# Patient Record
Sex: FEMALE | Race: WHITE | Employment: OTHER | URBAN - METROPOLITAN AREA
[De-identification: names, ages, dates, MRNs, and addresses within clinical notes are randomized per-mention and may not be internally consistent; named-entity substitution may affect disease eponyms.]

---

## 2017-01-31 DIAGNOSIS — F41.9 ANXIETY: Primary | ICD-10-CM

## 2017-01-31 RX ORDER — LORAZEPAM 1 MG/1
1.5 TABLET ORAL DAILY PRN
Qty: 135 TABLET | Refills: 0 | Status: SHIPPED | OUTPATIENT
Start: 2017-01-31 | End: 2017-05-15

## 2017-01-31 NOTE — TELEPHONE ENCOUNTER
Ativan- not previously filled by you- please advise   Last Written Prescription Date: 9/25/16  Last Fill Quantity: 135,  # refills: 0   Last Office Visit with [facility], [facility] or [facility] prescribing provider: 11/15/16

## 2017-02-13 ENCOUNTER — TELEPHONE (OUTPATIENT)
Dept: FAMILY MEDICINE | Facility: OTHER | Age: 67
End: 2017-02-13

## 2017-02-13 DIAGNOSIS — G89.29 CHRONIC PAIN OF LEFT KNEE: Primary | ICD-10-CM

## 2017-02-13 DIAGNOSIS — M25.562 CHRONIC PAIN OF LEFT KNEE: Primary | ICD-10-CM

## 2017-02-13 NOTE — TELEPHONE ENCOUNTER
If she is talking about a cyst behind the knee, then she should see Orthopedics, not general surgery.  Please clarify what she is looking for.

## 2017-04-18 DIAGNOSIS — J30.2 SEASONAL ALLERGIC RHINITIS, UNSPECIFIED ALLERGIC RHINITIS TRIGGER: Primary | ICD-10-CM

## 2017-04-18 RX ORDER — FLUTICASONE PROPIONATE 50 MCG
1-2 SPRAY, SUSPENSION (ML) NASAL DAILY
Qty: 1 BOTTLE | Refills: 0 | Status: SHIPPED | OUTPATIENT
Start: 2017-04-18 | End: 2017-06-14

## 2017-04-18 NOTE — TELEPHONE ENCOUNTER
flonase      Last Written Prescription Date: historical medication  Last Fill Quantity: 1,  # refills: 0   Last Office Visit with [facility] or [facility] prescribing provider: 11/15/16

## 2017-05-15 ENCOUNTER — OFFICE VISIT (OUTPATIENT)
Dept: FAMILY MEDICINE | Facility: OTHER | Age: 67
End: 2017-05-15
Attending: FAMILY MEDICINE
Payer: MEDICARE

## 2017-05-15 VITALS
TEMPERATURE: 97.2 F | SYSTOLIC BLOOD PRESSURE: 102 MMHG | HEART RATE: 60 BPM | WEIGHT: 119 LBS | DIASTOLIC BLOOD PRESSURE: 60 MMHG | BODY MASS INDEX: 19.83 KG/M2 | RESPIRATION RATE: 14 BRPM | HEIGHT: 65 IN

## 2017-05-15 DIAGNOSIS — Z12.31 ENCOUNTER FOR SCREENING MAMMOGRAM FOR BREAST CANCER: ICD-10-CM

## 2017-05-15 DIAGNOSIS — F41.9 ANXIETY: ICD-10-CM

## 2017-05-15 DIAGNOSIS — I83.92 VARICOSE VEINS OF LEFT LOWER EXTREMITY: ICD-10-CM

## 2017-05-15 DIAGNOSIS — M85.80 OSTEOPENIA: Primary | ICD-10-CM

## 2017-05-15 DIAGNOSIS — Z11.59 NEED FOR HEPATITIS C SCREENING TEST: ICD-10-CM

## 2017-05-15 DIAGNOSIS — Z78.0 POST-MENOPAUSAL: ICD-10-CM

## 2017-05-15 DIAGNOSIS — R53.83 FATIGUE, UNSPECIFIED TYPE: ICD-10-CM

## 2017-05-15 LAB
ERYTHROCYTE [DISTWIDTH] IN BLOOD BY AUTOMATED COUNT: 13.4 % (ref 10–15)
HCT VFR BLD AUTO: 39 % (ref 35–47)
HGB BLD-MCNC: 13 G/DL (ref 11.7–15.7)
MCH RBC QN AUTO: 31.5 PG (ref 26.5–33)
MCHC RBC AUTO-ENTMCNC: 33.3 G/DL (ref 31.5–36.5)
MCV RBC AUTO: 94 FL (ref 78–100)
PLATELET # BLD AUTO: 257 10E9/L (ref 150–450)
RBC # BLD AUTO: 4.13 10E12/L (ref 3.8–5.2)
TSH SERPL DL<=0.05 MIU/L-ACNC: 1.8 MU/L (ref 0.4–4)
WBC # BLD AUTO: 6 10E9/L (ref 4–11)

## 2017-05-15 PROCEDURE — 84443 ASSAY THYROID STIM HORMONE: CPT | Mod: ZL | Performed by: FAMILY MEDICINE

## 2017-05-15 PROCEDURE — 86803 HEPATITIS C AB TEST: CPT | Mod: ZL | Performed by: FAMILY MEDICINE

## 2017-05-15 PROCEDURE — 85027 COMPLETE CBC AUTOMATED: CPT | Mod: ZL | Performed by: FAMILY MEDICINE

## 2017-05-15 PROCEDURE — 99212 OFFICE O/P EST SF 10 MIN: CPT

## 2017-05-15 PROCEDURE — 99214 OFFICE O/P EST MOD 30 MIN: CPT | Performed by: FAMILY MEDICINE

## 2017-05-15 PROCEDURE — 36415 COLL VENOUS BLD VENIPUNCTURE: CPT | Mod: ZL | Performed by: FAMILY MEDICINE

## 2017-05-15 PROCEDURE — 99000 SPECIMEN HANDLING OFFICE-LAB: CPT | Performed by: FAMILY MEDICINE

## 2017-05-15 RX ORDER — LORAZEPAM 1 MG/1
1.5 TABLET ORAL DAILY PRN
Qty: 135 TABLET | Refills: 1 | Status: SHIPPED | OUTPATIENT
Start: 2017-05-15 | End: 2017-11-10

## 2017-05-15 NOTE — PROGRESS NOTES
SUBJECTIVE:                                                    Dena Cedeno is a 66 year old female who presents to clinic today for the following health issues:    Anxiety Follow-Up    Status since last visit: No change    Other associated symptoms:None    Complicating factors:   Significant life event: Yes-  Death in family   Current substance abuse: None  Depression symptoms: No  NANCY-7 SCORE 11/15/2016   Total Score 0        GAD7     Patient is also due for follow-up of osteopenia.  She would like DEXA at this time.  She states she would like to now visit with a Vascular surgeon regarding her symptomatic varicose veins on the left (Ortho has ruled out other causes of leg pain).  She is due to mammogram and hepatitis C screening.  Otherwise, chronic conditions are stable.  She does note some mild fatigue, and would like a TSH checked.        Problem list and histories reviewed & adjusted, as indicated.  Additional history: as documented    Patient Active Problem List   Diagnosis     ACP (advance care planning)     Osteopenia     Anxiety     Insomnia     Past Surgical History:   Procedure Laterality Date     APPENDECTOMY  2012     COLONOSCOPY  10/18/13    8 year follow-up recommended     GYN SURGERY  1978     D AND C     GYN SURGERY  1980    TUBAL LIGATION       Social History   Substance Use Topics     Smoking status: Former Smoker     Smokeless tobacco: Not on file     Alcohol use Yes      Comment: social     Family History   Problem Relation Age of Onset     OSTEOPOROSIS Mother      Colon Cancer Mother 58     Thyroid Cancer Daughter      CEREBROVASCULAR DISEASE Father      Alzheimer Disease Father      Hypertension Father      Dementia Sister      FRONTAL LOBE     Colon Cancer       grandfather     Colon Cancer       aunt         Current Outpatient Prescriptions   Medication Sig Dispense Refill     LORazepam (ATIVAN) 1 MG tablet Take 1.5 tablets (1.5 mg) by mouth daily as needed 135 tablet 1      "fluticasone (FLONASE) 50 MCG/ACT spray Spray 1-2 sprays into both nostrils daily 1 Bottle 0     Multiple Vitamins-Minerals (PRESERVISION AREDS PO) Take 2 capsules by mouth daily       Cholecalciferol (VITAMIN D3 PO) Take 2,000 Units by mouth daily       Omega-3 Fatty Acids (FISH OIL) 1200 MG CAPS Take 1 capsule by mouth daily       Calcium Carbonate (CALCIUM 600 PO) Take 1 tablet by mouth daily       magnesium 250 MG tablet Take 1 tablet by mouth 2 times daily        cetirizine (ZYRTEC) 10 MG tablet Take 10 mg by mouth daily as needed        order for DME Equipment being ordered: thigh high compression stocking, 15-20 mmHg (Patient not taking: Reported on 5/15/2017) 1 Device 0     Allergies   Allergen Reactions     Seasonal Allergies      Penicillins Rash     Sulfa Drugs Rash       Reviewed and updated as needed this visit by clinical staff  Tobacco  Allergies  Meds       Reviewed and updated as needed this visit by Provider         ROS:  Constitutional, HEENT, cardiovascular, pulmonary, gi and gu systems are negative, except as otherwise noted.    OBJECTIVE:                                                    /60 (BP Location: Left arm, Patient Position: Chair, Cuff Size: Adult Regular)  Pulse 60  Temp 97.2  F (36.2  C) (Tympanic)  Resp 14  Ht 5' 4.5\" (1.638 m)  Wt 119 lb (54 kg)  BMI 20.11 kg/m2  Body mass index is 20.11 kg/(m^2).  GENERAL: healthy, alert and no distress  PSYCH: mentation appears normal, affect normal/bright    Diagnostic Test Results:  none      ASSESSMENT/PLAN:                                                      1. Osteopenia  DEXA orders placed, follow-up with results when available.  - DX Hip/Pelvis/Spine; Future  - DX Hip/Pelvis/Spine    2. Post-menopausal  - DX Hip/Pelvis/Spine; Future  - DX Hip/Pelvis/Spine    3. Varicose veins of left lower extremity  Referral ordered.  - VASCULAR SURGERY REFERRAL    4. Anxiety  Refilled.  - LORazepam (ATIVAN) 1 MG tablet; Take 1.5 tablets " (1.5 mg) by mouth daily as needed  Dispense: 135 tablet; Refill: 1    5. Encounter for screening mammogram for breast cancer  Ordered.  - MA Digital Screening Bilateral; Future  - MA Digital Screening Bilateral    6. Fatigue, unspecified type  Lab orders placed.  - TSH  - CBC with platelets    7. Need for hepatitis C screening test  Ordered.  - Hepatitis C Screen Reflex to HCV RNA Quant and Genotype    FUTURE APPOINTMENTS:       - Follow-up visit in August.      Delaney Miller MD  [facility]

## 2017-05-15 NOTE — MR AVS SNAPSHOT
After Visit Summary   5/15/2017    Dena Cedeno    MRN: [id number]           Patient Information     Date Of Birth          1950        Visit Information        Provider Department      5/15/2017 11:15 AM Delaney Miller MD [facility]        Today's Diagnoses     Osteopenia    -  1    Post-menopausal        Varicose veins of left lower extremity        Anxiety        Encounter for screening mammogram for breast cancer        Fatigue, unspecified type        Need for hepatitis C screening test           Follow-ups after your visit        Additional Services     VASCULAR SURGERY REFERRAL       Your provider has referred you to: Vascular Surgery at [facility]    Please be aware that coverage of these services is subject to the terms and limitations of your health insurance plan.  Call member services at your health plan with any benefit or coverage questions.      Please bring the following with you to your appointment:    (1) Any X-Rays, CTs or MRIs which have been performed.  Contact the facility where they were done to arrange for  prior to your scheduled appointment.    (2) List of current medications   (3) This referral request   (4) Any documents/labs given to you for this referral                  Follow-up notes from your care team     Return in about 3 months (around 8/15/2017).      Who to contact     If you have questions or need follow up information about today's clinic visit or your schedule please contact [facility] directly at [phone].  Normal or non-critical lab and imaging results will be communicated to you by MyChart, letter or phone within 4 business days after the clinic has received the results. If you do not hear from us within 7 days, please contact the clinic through MyChart or phone. If you have a critical or abnormal lab result, we will notify you by phone as soon as possible.  Submit refill requests through [employer] or  "call your pharmacy and they will forward the refill request to us. Please allow 3 business days for your refill to be completed.          Additional Information About Your Visit        MyChart Information     [employer] lets you send messages to your doctor, view your test results, renew your prescriptions, schedule appointments and more. To sign up, go to [email or web address] . Click on \"Log in\" on the left side of the screen, which will take you to the Welcome page. Then click on \"Sign up Now\" on the right side of the page.     You will be asked to enter the access code listed below, as well as some personal information. Please follow the directions to create your username and password.     Your access code is: [email or web address]  Expires: 2017  1:10 PM     Your access code will  in 90 days. If you need help or a new code, please call your [place] clinic or [phone].        Care EveryWhere ID     This is your Care EveryWhere ID. This could be used by other organizations to access your [place] medical records  [id number]        Your Vitals Were     Pulse Temperature Respirations Height BMI (Body Mass Index)       60 97.2  F (36.2  C) (Tympanic) 14 5' 4.5\" (1.638 m) 20.11 kg/m2        Blood Pressure from Last 3 Encounters:   05/15/17 102/60   11/15/16 116/64   16 102/60    Weight from Last 3 Encounters:   05/15/17 119 lb (54 kg)   11/15/16 118 lb (53.5 kg)   16 111 lb (50.3 kg)              We Performed the Following     CBC with platelets     DX Hip/Pelvis/Spine     Hepatitis C Screen Reflex to HCV RNA Quant and Genotype     MA Digital Screening Bilateral     TSH     VASCULAR SURGERY REFERRAL          Where to get your medicines      Some of these will need a paper prescription and others can be bought over the counter.  Ask your nurse if you have questions.     Bring a paper prescription for each of these medications     LORazepam 1 MG tablet          Primary Care Provider Office Phone # " Fax #    Delaney TALIA Miller [phone]       [facility] [address] [address]        Thank you!     Thank you for choosing [facility]  for your care. Our goal is always to provide you with excellent care. Hearing back from our patients is one way we can continue to improve our services. Please take a few minutes to complete the written survey that you may receive in the mail after your visit with us. Thank you!             Your Updated Medication List - Protect others around you: Learn how to safely use, store and throw away your medicines at www.disposemymeds.org.          This list is accurate as of: 5/15/17  1:10 PM.  Always use your most recent med list.                   Brand Name Dispense Instructions for use    CALCIUM 600 PO      Take 1 tablet by mouth daily       cetirizine 10 MG tablet    zyrTEC     Take 10 mg by mouth daily as needed       Fish Oil 1200 MG Caps      Take 1 capsule by mouth daily       fluticasone 50 MCG/ACT spray    FLONASE    1 Bottle    Spray 1-2 sprays into both nostrils daily       LORazepam 1 MG tablet    ATIVAN    135 tablet    Take 1.5 tablets (1.5 mg) by mouth daily as needed       magnesium 250 MG tablet      Take 1 tablet by mouth 2 times daily       order for DME     1 Device    Equipment being ordered: thigh high compression stocking, 15-20 mmHg       PRESERVISION AREDS PO      Take 2 capsules by mouth daily       VITAMIN D3 PO      Take 2,000 Units by mouth daily

## 2017-05-15 NOTE — NURSING NOTE
"Chief Complaint   Patient presents with     Recheck Medication     Patient reports feeling run down and lost her cat recently, is loosing hair, mammo is overdue and may be due for a pelvic exam     Hepatitis C     Screen due.     Other     Dexa due       Initial /60 (BP Location: Left arm, Patient Position: Chair, Cuff Size: Adult Regular)  Pulse 60  Temp 97.2  F (36.2  C) (Tympanic)  Resp 14  Ht 5' 4.5\" (1.638 m)  Wt 119 lb (54 kg)  BMI 20.11 kg/m2 Estimated body mass index is 20.11 kg/(m^2) as calculated from the following:    Height as of this encounter: 5' 4.5\" (1.638 m).    Weight as of this encounter: 119 lb (54 kg).  Medication Reconciliation: complete   Kavitha Ricks      "

## 2017-05-17 LAB — HCV AB SERPL QL IA: NORMAL

## 2017-05-22 ENCOUNTER — TELEPHONE (OUTPATIENT)
Dept: FAMILY MEDICINE | Facility: OTHER | Age: 67
End: 2017-05-22

## 2017-05-22 DIAGNOSIS — I83.92 VARICOSE VEINS OF LEFT LOWER EXTREMITY: Primary | ICD-10-CM

## 2017-05-22 NOTE — TELEPHONE ENCOUNTER
Reason for call:  REFERRAL   1. Concern: Veins  2. Have you seen a provider for this concern? Yes  3. Who? Dr. Snyder  4. When? 05/15/17  5. What services are you requesting? Dr. Rosalio Khan [facility] [address]  Description: Patient was referred to a provider out of [facility] in [place] for veins, but that provider does not deal with veins and so she would like the referral to go to Dr. Khan.  Was an appointment offered for this a call? No  Preferred method for responding to this message: Telephone Call  If we cannot reach you directly, may we leave a detailed response at the number you provided? Yes  Can this message wait until your PCP/Provider returns if not available today? Not applicable,

## 2017-05-31 ENCOUNTER — HOSPITAL ENCOUNTER (OUTPATIENT)
Dept: GENERAL RADIOLOGY | Facility: HOSPITAL | Age: 67
Discharge: HOME | End: 2017-05-31
Attending: FAMILY MEDICINE | Admitting: FAMILY MEDICINE
Payer: MEDICARE

## 2017-05-31 PROCEDURE — 77080 DXA BONE DENSITY AXIAL: CPT | Mod: TC

## 2017-06-14 DIAGNOSIS — J30.2 SEASONAL ALLERGIC RHINITIS, UNSPECIFIED ALLERGIC RHINITIS TRIGGER: ICD-10-CM

## 2017-06-16 RX ORDER — FLUTICASONE PROPIONATE 50 MCG
SPRAY, SUSPENSION (ML) NASAL
Qty: 16 G | Refills: 3 | Status: SHIPPED | OUTPATIENT
Start: 2017-06-16 | End: 2017-09-20

## 2017-07-19 PROCEDURE — G0202 SCR MAMMO BI INCL CAD: HCPCS | Mod: TC

## 2017-09-20 ENCOUNTER — OFFICE VISIT (OUTPATIENT)
Dept: FAMILY MEDICINE | Facility: OTHER | Age: 67
End: 2017-09-20
Attending: NURSE PRACTITIONER
Payer: COMMERCIAL

## 2017-09-20 VITALS
OXYGEN SATURATION: 98 % | BODY MASS INDEX: 19.49 KG/M2 | WEIGHT: 117 LBS | HEIGHT: 65 IN | HEART RATE: 61 BPM | SYSTOLIC BLOOD PRESSURE: 104 MMHG | DIASTOLIC BLOOD PRESSURE: 70 MMHG | TEMPERATURE: 97.6 F

## 2017-09-20 DIAGNOSIS — J01.90 ACUTE SINUSITIS WITH SYMPTOMS > 10 DAYS: Primary | ICD-10-CM

## 2017-09-20 PROCEDURE — 99213 OFFICE O/P EST LOW 20 MIN: CPT | Performed by: NURSE PRACTITIONER

## 2017-09-20 PROCEDURE — 99213 OFFICE O/P EST LOW 20 MIN: CPT

## 2017-09-20 PROCEDURE — 99212 OFFICE O/P EST SF 10 MIN: CPT

## 2017-09-20 RX ORDER — FLUTICASONE PROPIONATE 50 MCG
SPRAY, SUSPENSION (ML) NASAL
Qty: 16 G | Refills: 3 | Status: SHIPPED | OUTPATIENT
Start: 2017-09-20 | End: 2019-06-25

## 2017-09-20 RX ORDER — DOXYCYCLINE 100 MG/1
100 CAPSULE ORAL 2 TIMES DAILY
Qty: 20 CAPSULE | Refills: 0 | Status: SHIPPED | OUTPATIENT
Start: 2017-09-20 | End: 2017-11-10

## 2017-09-20 ASSESSMENT — ANXIETY QUESTIONNAIRES
6. BECOMING EASILY ANNOYED OR IRRITABLE: NOT AT ALL
GAD7 TOTAL SCORE: 2
3. WORRYING TOO MUCH ABOUT DIFFERENT THINGS: SEVERAL DAYS
2. NOT BEING ABLE TO STOP OR CONTROL WORRYING: NOT AT ALL
5. BEING SO RESTLESS THAT IT IS HARD TO SIT STILL: NOT AT ALL
IF YOU CHECKED OFF ANY PROBLEMS ON THIS QUESTIONNAIRE, HOW DIFFICULT HAVE THESE PROBLEMS MADE IT FOR YOU TO DO YOUR WORK, TAKE CARE OF THINGS AT HOME, OR GET ALONG WITH OTHER PEOPLE: NOT DIFFICULT AT ALL
4. TROUBLE RELAXING: NOT AT ALL
1. FEELING NERVOUS, ANXIOUS, OR ON EDGE: SEVERAL DAYS
7. FEELING AFRAID AS IF SOMETHING AWFUL MIGHT HAPPEN: NOT AT ALL

## 2017-09-20 ASSESSMENT — PAIN SCALES - GENERAL: PAINLEVEL: MILD PAIN (3)

## 2017-09-20 ASSESSMENT — PATIENT HEALTH QUESTIONNAIRE - PHQ9: SUM OF ALL RESPONSES TO PHQ QUESTIONS 1-9: 0

## 2017-09-20 NOTE — PROGRESS NOTES
SUBJECTIVE:   Dena Cedeno is a 66 year old female who presents to clinic today for the following health issues:      Acute Illness   Acute illness concerns: sore throat   Onset: 2 weeks     Fever: no    Chills/Sweats: yes- always cold     Headache (location?): YES    Sinus Pressure:YES    Conjunctivitis:  YES: both    Ear Pain: no    Rhinorrhea: no    Congestion: YES    Sore Throat: YES     Cough: YES    Wheeze: no    Decreased Appetite: no    Nausea: YES    Vomiting: no    Diarrhea:  no    Dysuria/Freq.: no    Fatigue/Achiness: YES    Sick/Strep Exposure: no    Denies new rash     Therapies Tried and outcome: advil, zyrtec daily, netti pot        Problem list and histories reviewed & adjusted, as indicated.  Additional history: as documented    Patient Active Problem List   Diagnosis     ACP (advance care planning)     Osteopenia     Anxiety     Insomnia     Past Surgical History:   Procedure Laterality Date     APPENDECTOMY  2012     COLONOSCOPY  10/18/13    8 year follow-up recommended     GYN SURGERY  1978     D AND C     GYN SURGERY  1980    TUBAL LIGATION       Social History   Substance Use Topics     Smoking status: Former Smoker     Smokeless tobacco: Never Used     Alcohol use Yes      Comment: social     Family History   Problem Relation Age of Onset     OSTEOPOROSIS Mother      Colon Cancer Mother 58     CEREBROVASCULAR DISEASE Father      Alzheimer Disease Father      Hypertension Father      Thyroid Cancer Daughter      Dementia Sister      FRONTAL LOBE     Colon Cancer Other      grandfather     Colon Cancer Other      aunt         Current Outpatient Prescriptions   Medication Sig Dispense Refill     LORazepam (ATIVAN) 1 MG tablet Take 1.5 tablets (1.5 mg) by mouth daily as needed 135 tablet 1     order for DME Equipment being ordered: thigh high compression stocking, 15-20 mmHg 1 Device 0     Multiple Vitamins-Minerals (PRESERVISION AREDS PO) Take 2 capsules by mouth daily        "Cholecalciferol (VITAMIN D3 PO) Take 2,000 Units by mouth daily       Omega-3 Fatty Acids (FISH OIL) 1200 MG CAPS Take 1 capsule by mouth daily       Calcium Carbonate (CALCIUM 600 PO) Take 1 tablet by mouth daily       magnesium 250 MG tablet Take 1 tablet by mouth 2 times daily        cetirizine (ZYRTEC) 10 MG tablet Take 10 mg by mouth daily as needed        Allergies   Allergen Reactions     Seasonal Allergies      Penicillins Rash     Sulfa Drugs Rash     Recent Labs   Lab Test  05/15/17   1152  08/25/16   0807   LDL   --   111*   HDL   --   104   TRIG   --   60   TSH  1.80   --       BP Readings from Last 3 Encounters:   09/20/17 104/70   05/15/17 102/60   11/15/16 116/64    Wt Readings from Last 3 Encounters:   09/20/17 117 lb (53.1 kg)   05/15/17 119 lb (54 kg)   11/15/16 118 lb (53.5 kg)            Reviewed and updated as needed this visit by clinical staff     Reviewed and updated as needed this visit by Provider         ROS:  Constitutional, HEENT, cardiovascular, pulmonary, gi and gu systems are negative, except as otherwise noted.      OBJECTIVE:   /70 (BP Location: Left arm, Patient Position: Chair, Cuff Size: Adult Regular)  Pulse 61  Temp 97.6  F (36.4  C) (Tympanic)  Ht 5' 4.5\" (1.638 m)  Wt 117 lb (53.1 kg)  SpO2 98%  BMI 19.77 kg/m2  Body mass index is 19.77 kg/(m^2).  GENERAL: healthy, alert and no distress  HENT: normal cephalic/atraumatic, both ears: dull, pink, nose and mouth without ulcers or lesions, nasal mucosa edematous , rhinorrhea purulent and oral mucous membranes moist with post nasal drip noted and cobblestoning.    NECK: no adenopathy, no asymmetry, masses, or scars and thyroid normal to palpation  RESP: lungs clear to auscultation - no rales, rhonchi or wheezes  CV: regular rate and rhythm, normal S1 S2, no S3 or S4, no murmur, click or rub, no peripheral edema and peripheral pulses strong  MS: no gross musculoskeletal defects noted, no edema  PSYCH: mentation appears " normal, affect normal/bright        ASSESSMENT/PLAN:       1. Acute sinusitis with symptoms > 10 days  symptomatic  - doxycycline (VIBRAMYCIN) 100 MG capsule; Take 1 capsule (100 mg) by mouth 2 times daily  Dispense: 20 capsule; Refill: 0  - fluticasone (FLONASE) 50 MCG/ACT spray; USE ONE TO TWO SPRAY(S) IN EACH NOSTRIL ONCE DAILY  Dispense: 16 g; Refill: 3  - continue zyrtec and netti pot    FUTURE APPOINTMENTS:       - Follow-up visit if no improvement or any worsening    Soraida Becerril, NP  [facility]

## 2017-09-20 NOTE — MR AVS SNAPSHOT
"              After Visit Summary   9/20/2017    Dena Cedeno    MRN: [id number]           Patient Information     Date Of Birth          1950        Visit Information        Provider Department      9/20/2017 2:00 PM Soraida Becerril NP [facility]        Today's Diagnoses     Acute sinusitis with symptoms > 10 days    -  1      Care Instructions      ASSESSMENT/PLAN:       1. Acute sinusitis with symptoms > 10 days  symptomatic  - doxycycline (VIBRAMYCIN) 100 MG capsule; Take 1 capsule (100 mg) by mouth 2 times daily  Dispense: 20 capsule; Refill: 0  - fluticasone (FLONASE) 50 MCG/ACT spray; USE ONE TO TWO SPRAY(S) IN EACH NOSTRIL ONCE DAILY  Dispense: 16 g; Refill: 3  - continue zyrtec and netti pot    FUTURE APPOINTMENTS:       - Follow-up visit if no improvement or any worsening    Soraida Becerril NP  [facility]          Follow-ups after your visit        Who to contact     If you have questions or need follow up information about today's clinic visit or your schedule please contact [facility] directly at [phone].  Normal or non-critical lab and imaging results will be communicated to you by MyChart, letter or phone within 4 business days after the clinic has received the results. If you do not hear from us within 7 days, please contact the clinic through MyChart or phone. If you have a critical or abnormal lab result, we will notify you by phone as soon as possible.  Submit refill requests through [employer] or call your pharmacy and they will forward the refill request to us. Please allow 3 business days for your refill to be completed.          Additional Information About Your Visit        MyChart Information     [employer] lets you send messages to your doctor, view your test results, renew your prescriptions, schedule appointments and more. To sign up, go to [email or web address] . Click on \"Log in\" on the left side of the screen, which " "will take you to the Welcome page. Then click on \"Sign up Now\" on the right side of the page.     You will be asked to enter the access code listed below, as well as some personal information. Please follow the directions to create your username and password.     Your access code is: [email or web address]  Expires: 2017  2:06 PM     Your access code will  in 90 days. If you need help or a new code, please call your [facility] or [phone].        Care EveryWhere ID     This is your Care EveryWhere ID. This could be used by other organizations to access your [place] medical records  [id number]        Your Vitals Were     Pulse Temperature Height Pulse Oximetry BMI (Body Mass Index)       61 97.6  F (36.4  C) (Tympanic) 5' 4.5\" (1.638 m) 98% 19.77 kg/m2        Blood Pressure from Last 3 Encounters:   17 104/70   05/15/17 102/60   11/15/16 116/64    Weight from Last 3 Encounters:   17 117 lb (53.1 kg)   05/15/17 119 lb (54 kg)   11/15/16 118 lb (53.5 kg)              Today, you had the following     No orders found for display         Today's Medication Changes          These changes are accurate as of: 17  2:06 PM.  If you have any questions, ask your nurse or doctor.               Start taking these medicines.        Dose/Directions    doxycycline 100 MG capsule   Commonly known as:  VIBRAMYCIN   Used for:  Acute sinusitis with symptoms > 10 days   Started by:  Soraida Becerril NP        Dose:  100 mg   Take 1 capsule (100 mg) by mouth 2 times daily   Quantity:  20 capsule   Refills:  0         These medicines have changed or have updated prescriptions.        Dose/Directions    fluticasone 50 MCG/ACT spray   Commonly known as:  FLONASE   This may have changed:  See the new instructions.   Used for:  Acute sinusitis with symptoms > 10 days   Changed by:  Soraida Becerril NP        USE ONE TO TWO SPRAY(S) IN EACH NOSTRIL ONCE DAILY   Quantity:  16 g   Refills:  3       "      Where to get your medicines      These medications were sent to [id number] - [place], [place] - [address]   [address] , [facility] [address]     Phone:  [phone]     doxycycline 100 MG capsule    fluticasone 50 MCG/ACT spray                Primary Care Provider Office Phone # Fax #    Delaney TALIA Miller [phone] [facility] [address]        Equal Access to Services     CASSANDRA RASHEED : Hadii aad ku hadasho Soomaali, waaxda luqadaha, qaybta kaalmada adeegyada, waxay idiin hayaan adeeg catalina jones . So [facility] [phone].    ATENCIÓN: Si habla español, tiene a mon disposición servicios gratuitos de asistencia lingüística. [facility] [phone].    We comply with applicable federal civil rights laws and [place] laws. We do not discriminate on the basis of race, color, national origin, age, disability sex, sexual orientation or gender identity.            Thank you!     Thank you for choosing [facility]  for your care. Our goal is always to provide you with excellent care. Hearing back from our patients is one way we can continue to improve our services. Please take a few minutes to complete the written survey that you may receive in the mail after your visit with us. Thank you!             Your Updated Medication List - Protect others around you: Learn how to safely use, store and throw away your medicines at www.disposemymeds.org.          This list is accurate as of: 9/20/17  2:06 PM.  Always use your most recent med list.                   Brand Name Dispense Instructions for use Diagnosis    CALCIUM 600 PO      Take 1 tablet by mouth daily        cetirizine 10 MG tablet    zyrTEC     Take 10 mg by mouth daily as needed        doxycycline 100 MG capsule    VIBRAMYCIN    20 capsule    Take 1 capsule (100 mg) by mouth 2 times daily    Acute sinusitis with symptoms > 10 days       Fish Oil 1200 MG Caps      Take  1 capsule by mouth daily        fluticasone 50 MCG/ACT spray    FLONASE    16 g    USE ONE TO TWO SPRAY(S) IN EACH NOSTRIL ONCE DAILY    Acute sinusitis with symptoms > 10 days       LORazepam 1 MG tablet    ATIVAN    135 tablet    Take 1.5 tablets (1.5 mg) by mouth daily as needed    Anxiety       magnesium 250 MG tablet      Take 1 tablet by mouth 2 times daily        order for DME     1 Device    Equipment being ordered: thigh high compression stocking, 15-20 mmHg    Varicose veins of left lower extremity       PRESERVISION AREDS PO      Take 2 capsules by mouth daily        VITAMIN D3 PO      Take 2,000 Units by mouth daily

## 2017-09-20 NOTE — NURSING NOTE
"Chief Complaint   Patient presents with     URI       Initial /70 (BP Location: Left arm, Patient Position: Chair, Cuff Size: Adult Regular)  Pulse 61  Temp 97.6  F (36.4  C) (Tympanic)  Ht 5' 4.5\" (1.638 m)  Wt 117 lb (53.1 kg)  SpO2 98%  BMI 19.77 kg/m2 Estimated body mass index is 19.77 kg/(m^2) as calculated from the following:    Height as of this encounter: 5' 4.5\" (1.638 m).    Weight as of this encounter: 117 lb (53.1 kg).  Medication Reconciliation: complete     KRISTA TATUM      "

## 2017-09-20 NOTE — PATIENT INSTRUCTIONS
ASSESSMENT/PLAN:       1. Acute sinusitis with symptoms > 10 days  symptomatic  - doxycycline (VIBRAMYCIN) 100 MG capsule; Take 1 capsule (100 mg) by mouth 2 times daily  Dispense: 20 capsule; Refill: 0  - fluticasone (FLONASE) 50 MCG/ACT spray; USE ONE TO TWO SPRAY(S) IN EACH NOSTRIL ONCE DAILY  Dispense: 16 g; Refill: 3  - continue zyrtec and netti pot    FUTURE APPOINTMENTS:       - Follow-up visit if no improvement or any worsening    Soraida Becerril, NP  [facility]

## 2017-09-21 ASSESSMENT — ANXIETY QUESTIONNAIRES: GAD7 TOTAL SCORE: 2

## 2017-11-10 ENCOUNTER — OFFICE VISIT (OUTPATIENT)
Dept: FAMILY MEDICINE | Facility: OTHER | Age: 67
End: 2017-11-10
Attending: FAMILY MEDICINE
Payer: COMMERCIAL

## 2017-11-10 VITALS
WEIGHT: 116.8 LBS | RESPIRATION RATE: 14 BRPM | BODY MASS INDEX: 22.93 KG/M2 | HEIGHT: 60 IN | OXYGEN SATURATION: 99 % | DIASTOLIC BLOOD PRESSURE: 62 MMHG | SYSTOLIC BLOOD PRESSURE: 112 MMHG | TEMPERATURE: 96.5 F | HEART RATE: 80 BPM

## 2017-11-10 DIAGNOSIS — M85.89 OSTEOPENIA OF MULTIPLE SITES: ICD-10-CM

## 2017-11-10 DIAGNOSIS — L82.1 SEBORRHEIC KERATOSIS: Primary | ICD-10-CM

## 2017-11-10 DIAGNOSIS — F41.9 ANXIETY: ICD-10-CM

## 2017-11-10 PROCEDURE — 99214 OFFICE O/P EST MOD 30 MIN: CPT | Performed by: FAMILY MEDICINE

## 2017-11-10 PROCEDURE — 99212 OFFICE O/P EST SF 10 MIN: CPT

## 2017-11-10 RX ORDER — LORAZEPAM 1 MG/1
1.5 TABLET ORAL DAILY PRN
Qty: 135 TABLET | Refills: 1 | Status: SHIPPED | OUTPATIENT
Start: 2017-11-10 | End: 2018-08-15

## 2017-11-10 ASSESSMENT — ANXIETY QUESTIONNAIRES
2. NOT BEING ABLE TO STOP OR CONTROL WORRYING: NOT AT ALL
3. WORRYING TOO MUCH ABOUT DIFFERENT THINGS: NOT AT ALL
6. BECOMING EASILY ANNOYED OR IRRITABLE: NOT AT ALL
1. FEELING NERVOUS, ANXIOUS, OR ON EDGE: NOT AT ALL
GAD7 TOTAL SCORE: 0
5. BEING SO RESTLESS THAT IT IS HARD TO SIT STILL: NOT AT ALL
7. FEELING AFRAID AS IF SOMETHING AWFUL MIGHT HAPPEN: NOT AT ALL

## 2017-11-10 ASSESSMENT — PATIENT HEALTH QUESTIONNAIRE - PHQ9
5. POOR APPETITE OR OVEREATING: NOT AT ALL
SUM OF ALL RESPONSES TO PHQ QUESTIONS 1-9: 3

## 2017-11-10 NOTE — PROGRESS NOTES
SUBJECTIVE:     SUBJECTIVE:   Dena Cedeno is a 66 year old female who presents to clinic today for the following health issues:      Concern - moles  Onset: unknown    Description:   Right lower leg    Intensity: mild    Progression of Symptoms:  same    Accompanying Signs & Symptoms:  none    Previous history of similar problem:   yes    Precipitating factors:   Worsened by: none    Alleviating factors:  Improved by: none    Therapies Tried and outcome: none      Patient would like to discuss bone density.  She does have the report of her previous 2 DEXA scans for comparison.      Problem list and histories reviewed & adjusted, as indicated.  Additional history: as documented    Patient Active Problem List   Diagnosis     ACP (advance care planning)     Osteopenia     Anxiety     Insomnia     Past Surgical History:   Procedure Laterality Date     APPENDECTOMY  2012     COLONOSCOPY  10/18/13    8 year follow-up recommended     GYN SURGERY  1978     D AND C     GYN SURGERY  1980    TUBAL LIGATION       Social History   Substance Use Topics     Smoking status: Former Smoker     Smokeless tobacco: Never Used     Alcohol use Yes      Comment: social     Family History   Problem Relation Age of Onset     OSTEOPOROSIS Mother      Colon Cancer Mother 58     CEREBROVASCULAR DISEASE Father      Alzheimer Disease Father      Hypertension Father      Thyroid Cancer Daughter      Dementia Sister      FRONTAL LOBE     Colon Cancer Other      grandfather     Colon Cancer Other      aunt         Current Outpatient Prescriptions   Medication Sig Dispense Refill     LORazepam (ATIVAN) 1 MG tablet Take 1.5 tablets (1.5 mg) by mouth daily as needed 135 tablet 1     fluticasone (FLONASE) 50 MCG/ACT spray USE ONE TO TWO SPRAY(S) IN EACH NOSTRIL ONCE DAILY 16 g 3     order for DME Equipment being ordered: thigh high compression stocking, 15-20 mmHg 1 Device 0     Multiple Vitamins-Minerals (PRESERVISION AREDS PO) Take 2 capsules  by mouth daily       Cholecalciferol (VITAMIN D3 PO) Take 4,000 Units by mouth daily        Omega-3 Fatty Acids (FISH OIL) 1200 MG CAPS Take 1 capsule by mouth daily       Calcium Carbonate (CALCIUM 600 PO) Take 2 tablets by mouth daily        magnesium 250 MG tablet Take 1 tablet by mouth 2 times daily        cetirizine (ZYRTEC) 10 MG tablet Take 10 mg by mouth daily as needed        Allergies   Allergen Reactions     Seasonal Allergies      Penicillins Rash     Sulfa Drugs Rash         Reviewed and updated as needed this visit by clinical staffTobacco  Allergies  Meds       Reviewed and updated as needed this visit by Provider         ROS:  Constitutional, HEENT, cardiovascular, pulmonary, gi and gu systems are negative, except as otherwise noted.      OBJECTIVE:   /62 (BP Location: Right arm, Patient Position: Sitting, Cuff Size: Adult Regular)  Pulse 80  Temp 96.5  F (35.8  C) (Tympanic)  Resp 14  Ht 5' (1.524 m)  Wt 116 lb 12.8 oz (53 kg)  SpO2 99%  BMI 22.81 kg/m2  Body mass index is 22.81 kg/(m^2).  GENERAL: healthy, alert and no distress  SKIN: plaque - lower legs, consistent with seborrheic keratosis.  PSYCH: mentation appears normal, affect normal/bright    Diagnostic Test Results:  Current and previous DEXA reports reviewed.    ASSESSMENT/PLAN:     1. Seborrheic keratosis  Benign, reassurance given.    2. Anxiety  Refilled.  - LORazepam (ATIVAN) 1 MG tablet; Take 1.5 tablets (1.5 mg) by mouth daily as needed  Dispense: 135 tablet; Refill: 1    3. Osteopenia of multiple sites  DEXA reports reviewed.  There has not been significant change from previous scan in 2014 to current scan.  Continue activity and calcium/Vitamin D as discussed.  Follow-up as needed.        Over 30 minutes are spent with the patient, over 50% of which was in education and counseling regarding current conditions and treatment/therapy options/risks/benefits/etc.      Delaney Miller MD  [facility]

## 2017-11-10 NOTE — MR AVS SNAPSHOT
"              After Visit Summary   11/10/2017    Dena Cedeno    MRN: [id number]           Patient Information     Date Of Birth          1950        Visit Information        Provider Department      11/10/2017 11:15 AM Delaney Miller MD [facility]        Today's Diagnoses     Seborrheic keratosis    -  1    Anxiety        Osteopenia of multiple sites           Follow-ups after your visit        Follow-up notes from your care team     Return if symptoms worsen or fail to improve.      Who to contact     If you have questions or need follow up information about today's clinic visit or your schedule please contact [facility] directly at [phone].  Normal or non-critical lab and imaging results will be communicated to you by MyChart, letter or phone within 4 business days after the clinic has received the results. If you do not hear from us within 7 days, please contact the clinic through MyChart or phone. If you have a critical or abnormal lab result, we will notify you by phone as soon as possible.  Submit refill requests through [employer] or call your pharmacy and they will forward the refill request to us. Please allow 3 business days for your refill to be completed.          Additional Information About Your Visit        MyChart Information     [employer] lets you send messages to your doctor, view your test results, renew your prescriptions, schedule appointments and more. To sign up, go to [email or web address] . Click on \"Log in\" on the left side of the screen, which will take you to the Welcome page. Then click on \"Sign up Now\" on the right side of the page.     You will be asked to enter the access code listed below, as well as some personal information. Please follow the directions to create your username and password.     Your access code is: [email or web address]  Expires: 2017  1:06 PM     Your access code will  in 90 days. If you need help or a new code, " please call your [place] clinic or [phone].        Care EveryWhere ID     This is your Care EveryWhere ID. This could be used by other organizations to access your [place] medical records  [id number]        Your Vitals Were     Pulse Temperature Respirations Height Pulse Oximetry BMI (Body Mass Index)    80 96.5  F (35.8  C) (Tympanic) 14 5' (1.524 m) 99% 22.81 kg/m2       Blood Pressure from Last 3 Encounters:   11/10/17 112/62   09/20/17 104/70   05/15/17 102/60    Weight from Last 3 Encounters:   11/10/17 116 lb 12.8 oz (53 kg)   09/20/17 117 lb (53.1 kg)   05/15/17 119 lb (54 kg)              Today, you had the following     No orders found for display         Where to get your medicines      Some of these will need a paper prescription and others can be bought over the counter.  Ask your nurse if you have questions.     Bring a paper prescription for each of these medications     LORazepam 1 MG tablet          Primary Care Provider Office Phone # Fax #    Delaney Miller [phone] [address] [facility] [address]        Equal Access to Services     ELIZABETH RASHEED AH: Hadii paulie hope Soaleksey, waaxda luqadaha, qaybta kaalmada smooth, elizabeth jaramillo. So [facility] [phone].    ATENCIÓN: Si habla español, tiene a mon disposición servicios gratuitos de asistencia lingüística. Llame al [phone].    We comply with applicable federal civil rights laws and [place] laws. We do not discriminate on the basis of race, color, national origin, age, disability, sex, sexual orientation, or gender identity.            Thank you!     Thank you for choosing [facility]  for your care. Our goal is always to provide you with excellent care. Hearing back from our patients is one way we can continue to improve our services. Please take a few minutes to complete the written survey that you may receive in the mail after your visit with  us. Thank you!             Your Updated Medication List - Protect others around you: Learn how to safely use, store and throw away your medicines at www.disposemymeds.org.          This list is accurate as of: 11/10/17  1:40 PM.  Always use your most recent med list.                   Brand Name Dispense Instructions for use Diagnosis    CALCIUM 600 PO      Take 2 tablets by mouth daily        cetirizine 10 MG tablet    zyrTEC     Take 10 mg by mouth daily as needed        fish Oil 1200 MG capsule      Take 1 capsule by mouth daily        fluticasone 50 MCG/ACT spray    FLONASE    16 g    USE ONE TO TWO SPRAY(S) IN EACH NOSTRIL ONCE DAILY    Acute sinusitis with symptoms > 10 days       LORazepam 1 MG tablet    ATIVAN    135 tablet    Take 1.5 tablets (1.5 mg) by mouth daily as needed    Anxiety       magnesium 250 MG tablet      Take 1 tablet by mouth 2 times daily        order for DME     1 Device    Equipment being ordered: thigh high compression stocking, 15-20 mmHg    Varicose veins of left lower extremity       PRESERVISION AREDS PO      Take 2 capsules by mouth daily        VITAMIN D3 PO      Take 4,000 Units by mouth daily

## 2017-11-11 ASSESSMENT — ANXIETY QUESTIONNAIRES: GAD7 TOTAL SCORE: 0

## 2018-03-12 ENCOUNTER — OFFICE VISIT (OUTPATIENT)
Dept: FAMILY MEDICINE | Facility: OTHER | Age: 68
End: 2018-03-12
Attending: FAMILY MEDICINE
Payer: COMMERCIAL

## 2018-03-12 VITALS
WEIGHT: 118 LBS | OXYGEN SATURATION: 99 % | HEART RATE: 62 BPM | HEIGHT: 64 IN | SYSTOLIC BLOOD PRESSURE: 100 MMHG | DIASTOLIC BLOOD PRESSURE: 66 MMHG | TEMPERATURE: 96.4 F | BODY MASS INDEX: 20.14 KG/M2 | RESPIRATION RATE: 16 BRPM

## 2018-03-12 DIAGNOSIS — N95.2 VAGINAL ATROPHY: Primary | ICD-10-CM

## 2018-03-12 PROCEDURE — 99213 OFFICE O/P EST LOW 20 MIN: CPT | Performed by: FAMILY MEDICINE

## 2018-03-12 PROCEDURE — G0463 HOSPITAL OUTPT CLINIC VISIT: HCPCS

## 2018-03-12 ASSESSMENT — ANXIETY QUESTIONNAIRES
2. NOT BEING ABLE TO STOP OR CONTROL WORRYING: SEVERAL DAYS
6. BECOMING EASILY ANNOYED OR IRRITABLE: SEVERAL DAYS
1. FEELING NERVOUS, ANXIOUS, OR ON EDGE: SEVERAL DAYS
GAD7 TOTAL SCORE: 6
3. WORRYING TOO MUCH ABOUT DIFFERENT THINGS: SEVERAL DAYS
4. TROUBLE RELAXING: NOT AT ALL
7. FEELING AFRAID AS IF SOMETHING AWFUL MIGHT HAPPEN: SEVERAL DAYS
5. BEING SO RESTLESS THAT IT IS HARD TO SIT STILL: SEVERAL DAYS
IF YOU CHECKED OFF ANY PROBLEMS ON THIS QUESTIONNAIRE, HOW DIFFICULT HAVE THESE PROBLEMS MADE IT FOR YOU TO DO YOUR WORK, TAKE CARE OF THINGS AT HOME, OR GET ALONG WITH OTHER PEOPLE: NOT DIFFICULT AT ALL

## 2018-03-12 ASSESSMENT — PAIN SCALES - GENERAL: PAINLEVEL: NO PAIN (0)

## 2018-03-12 NOTE — PROGRESS NOTES
SUBJECTIVE:   Dena Cedeno is a 67 year old female who presents to clinic today for the following health issues:      Vaginal and labia dryness      Duration: 3 weeks    Description (location/character/radiation): extreme dryness    Intensity:  moderate, severe    Accompanying signs and symptoms: tenderness    History (similar episodes/previous evaluation): yes    Precipitating or alleviating factors: sensitive wipes irritated more    Therapies tried and outcome: Aloe spray mist OTC is helpfull       Patient has been told she has vaginal atrophy in the past, but states she hasn't been symptomatic until more recently.      Problem list and histories reviewed & adjusted, as indicated.  Additional history: as documented    Patient Active Problem List   Diagnosis     ACP (advance care planning)     Osteopenia     Anxiety     Insomnia     Past Surgical History:   Procedure Laterality Date     APPENDECTOMY  2012     COLONOSCOPY  10/18/13    8 year follow-up recommended     GYN SURGERY  1978     D AND C     GYN SURGERY  1980    TUBAL LIGATION       Social History   Substance Use Topics     Smoking status: Former Smoker     Smokeless tobacco: Never Used     Alcohol use Yes      Comment: social     Family History   Problem Relation Age of Onset     OSTEOPOROSIS Mother      Colon Cancer Mother 58     CEREBROVASCULAR DISEASE Father      Alzheimer Disease Father      Hypertension Father      Thyroid Cancer Daughter      Dementia Sister      FRONTAL LOBE     Colon Cancer Other      grandfather     Colon Cancer Other      aunt         Current Outpatient Prescriptions   Medication Sig Dispense Refill     ESTRADIOL 0.1MG/GM CREAM Insert 1 gram vaginally 2 times per week 42 g 2     LORazepam (ATIVAN) 1 MG tablet Take 1.5 tablets (1.5 mg) by mouth daily as needed 135 tablet 1     fluticasone (FLONASE) 50 MCG/ACT spray USE ONE TO TWO SPRAY(S) IN EACH NOSTRIL ONCE DAILY 16 g 3     order for DME Equipment being ordered: thigh high  "compression stocking, 15-20 mmHg 1 Device 0     Multiple Vitamins-Minerals (PRESERVISION AREDS PO) Take 2 capsules by mouth daily       Cholecalciferol (VITAMIN D3 PO) Take 4,000 Units by mouth daily        Omega-3 Fatty Acids (FISH OIL) 1200 MG CAPS Take 1 capsule by mouth daily       Calcium Carbonate (CALCIUM 600 PO) Take 2 tablets by mouth daily        magnesium 250 MG tablet Take 1 tablet by mouth 2 times daily        cetirizine (ZYRTEC) 10 MG tablet Take 10 mg by mouth daily as needed        Allergies   Allergen Reactions     Seasonal Allergies      Penicillins Rash     Sulfa Drugs Rash       Reviewed and updated as needed this visit by clinical staff  Tobacco  Allergies  Meds  Problems  Med Hx  Surg Hx  Fam Hx  Soc Hx        Reviewed and updated as needed this visit by Provider         ROS:  Constitutional, HEENT, cardiovascular, pulmonary, gi and gu systems are negative, except as otherwise noted.    OBJECTIVE:     /66 (BP Location: Left arm, Patient Position: Sitting, Cuff Size: Adult Regular)  Pulse 62  Temp 96.4  F (35.8  C) (Tympanic)  Resp 16  Ht 5' 4.25\" (1.632 m)  Wt 118 lb (53.5 kg)  SpO2 99%  BMI 20.1 kg/m2  Body mass index is 20.1 kg/(m^2).  GENERAL: healthy, alert and no distress   (female): normal external genitalia, atrophy noted, no plaques or thinning areas of skin noted, no masses noted  PSYCH: mentation appears normal, affect normal/bright        ASSESSMENT/PLAN:     1. Vaginal atrophy  Patient agrees to try vaginal estrogen twice weekly.   She will continue to use the aloe mist.  Follow-up as needed.  - ESTRADIOL 0.1MG/GM CREAM; Insert 1 gram vaginally 2 times per week  Dispense: 42 g; Refill: 2        Delaney Miller MD  [facility]    "

## 2018-03-12 NOTE — MR AVS SNAPSHOT
"              After Visit Summary   3/12/2018    Dena Cedeno    MRN: [id number]           Patient Information     Date Of Birth          1950        Visit Information        Provider Department      3/12/2018 10:15 AM Delaney Miller MD [facility]        Today's Diagnoses     Vaginal atrophy    -  1       Follow-ups after your visit        Follow-up notes from your care team     Return if symptoms worsen or fail to improve.      Who to contact     If you have questions or need follow up information about today's clinic visit or your schedule please contact [facility] directly at [phone].  Normal or non-critical lab and imaging results will be communicated to you by MyChart, letter or phone within 4 business days after the clinic has received the results. If you do not hear from us within 7 days, please contact the clinic through [employer] or phone. If you have a critical or abnormal lab result, we will notify you by phone as soon as possible.  Submit refill requests through [employer] or call your pharmacy and they will forward the refill request to us. Please allow 3 business days for your refill to be completed.          Additional Information About Your Visit        MyChart Information     [employer] lets you send messages to your doctor, view your test results, renew your prescriptions, schedule appointments and more. To sign up, go to [email or web address] . Click on \"Log in\" on the left side of the screen, which will take you to the Welcome page. Then click on \"Sign up Now\" on the right side of the page.     You will be asked to enter the access code listed below, as well as some personal information. Please follow the directions to create your username and password.     Your access code is: [email or web address]  Expires: 6/10/2018  4:58 PM     Your access code will  in 90 days. If you need help or a new code, please call your [facility] or [phone].        Care " "EveryWhere ID     This is your Care EveryWhere ID. This could be used by other organizations to access your [place] medical records  [id number]        Your Vitals Were     Pulse Temperature Respirations Height Pulse Oximetry BMI (Body Mass Index)    62 96.4  F (35.8  C) (Tympanic) 16 5' 4.25\" (1.632 m) 99% 20.1 kg/m2       Blood Pressure from Last 3 Encounters:   03/12/18 100/66   11/10/17 112/62   09/20/17 104/70    Weight from Last 3 Encounters:   03/12/18 118 lb (53.5 kg)   11/10/17 116 lb 12.8 oz (53 kg)   09/20/17 117 lb (53.1 kg)              Today, you had the following     No orders found for display         Today's Medication Changes          These changes are accurate as of 3/12/18  4:58 PM.  If you have any questions, ask your nurse or doctor.               Start taking these medicines.        Dose/Directions    ESTRADIOL 0.1MG/GM CREAM   Used for:  Vaginal atrophy   Started by:  Delaney Miller MD        Insert 1 gram vaginally 2 times per week   Quantity:  42 g   Refills:  2            Where to get your medicines      These medications were sent to [facility] [address] [place] [address] [address]   [address] [facility] [address]     Phone:  [phone]     ESTRADIOL 0.1MG/GM CREAM                Primary Care Provider Office Phone # Fax #    Delaney Miller [phone] [address] [facility] [address]        Equal Access to Services     [facility] AH: Hadii paulie michaud hadasho Soomaali, waaxda luqadaha, qaybta kaalmada adeegyanathaly, waxjaswinder lata jaramillo. So [facility] [phone].    ATENCIÓN: Si habla español, tiene a mon disposición servicios gratuitos de asistencia lingüística. Llame al [phone].    We comply with applicable federal civil rights laws and [place] laws. We do not discriminate on the basis of race, color, national origin, age, disability, sex, sexual orientation, or gender identity.            Thank " you!     Thank you for choosing [facility]  for your care. Our goal is always to provide you with excellent care. Hearing back from our patients is one way we can continue to improve our services. Please take a few minutes to complete the written survey that you may receive in the mail after your visit with us. Thank you!             Your Updated Medication List - Protect others around you: Learn how to safely use, store and throw away your medicines at www.disposemymeds.org.          This list is accurate as of 3/12/18  4:58 PM.  Always use your most recent med list.                   Brand Name Dispense Instructions for use Diagnosis    CALCIUM 600 PO      Take 2 tablets by mouth daily        cetirizine 10 MG tablet    zyrTEC     Take 10 mg by mouth daily as needed        ESTRADIOL 0.1MG/GM CREAM     42 g    Insert 1 gram vaginally 2 times per week    Vaginal atrophy       fish Oil 1200 MG capsule      Take 1 capsule by mouth daily        fluticasone 50 MCG/ACT spray    FLONASE    16 g    USE ONE TO TWO SPRAY(S) IN EACH NOSTRIL ONCE DAILY    Acute sinusitis with symptoms > 10 days       LORazepam 1 MG tablet    ATIVAN    135 tablet    Take 1.5 tablets (1.5 mg) by mouth daily as needed    Anxiety       magnesium 250 MG tablet      Take 1 tablet by mouth 2 times daily        order for DME     1 Device    Equipment being ordered: thigh high compression stocking, 15-20 mmHg    Varicose veins of left lower extremity       PRESERVISION AREDS PO      Take 2 capsules by mouth daily        VITAMIN D3 PO      Take 4,000 Units by mouth daily

## 2018-03-12 NOTE — NURSING NOTE
"Chief Complaint   Patient presents with     Symptoms     labia and vaginal dryness       Initial /66 (BP Location: Left arm, Patient Position: Sitting, Cuff Size: Adult Regular)  Pulse 62  Temp 96.4  F (35.8  C) (Tympanic)  Resp 16  Ht 5' 4.25\" (1.632 m)  Wt 118 lb (53.5 kg)  SpO2 99%  BMI 20.1 kg/m2 Estimated body mass index is 20.1 kg/(m^2) as calculated from the following:    Height as of this encounter: 5' 4.25\" (1.632 m).    Weight as of this encounter: 118 lb (53.5 kg).  Medication Reconciliation: complete     Vera Acosta      "

## 2018-03-13 ASSESSMENT — PATIENT HEALTH QUESTIONNAIRE - PHQ9: SUM OF ALL RESPONSES TO PHQ QUESTIONS 1-9: 2

## 2018-03-13 ASSESSMENT — ANXIETY QUESTIONNAIRES: GAD7 TOTAL SCORE: 6

## 2018-04-11 ENCOUNTER — TELEPHONE (OUTPATIENT)
Dept: FAMILY MEDICINE | Facility: OTHER | Age: 68
End: 2018-04-11

## 2018-04-11 NOTE — TELEPHONE ENCOUNTER
"Patient aware MD is out today and prefers for her to receive this message. She had started estrace about a month ago and developed diarrhea.She thought this could have been a side effect. Stopped Estrace medication on 4.2.18. She continues to have diarrhea. Imodium has given some relief. Feels like the frequency has decreased.She dose feel a little \"run down\" from having this. Is drinking fluids. Denies recent use of ABX.OK to leave detailed message on her cell #. Please advise.  "

## 2018-04-12 NOTE — TELEPHONE ENCOUNTER
I would recommend ALEYDA diet (bananas, rice, applesauce, toast, tea) and plenty of fluids.  If symptoms persist, would check stool sample for infection.

## 2018-04-23 ENCOUNTER — OFFICE VISIT (OUTPATIENT)
Dept: FAMILY MEDICINE | Facility: OTHER | Age: 68
End: 2018-04-23
Attending: FAMILY MEDICINE
Payer: MEDICARE

## 2018-04-23 VITALS
WEIGHT: 115.4 LBS | HEIGHT: 65 IN | DIASTOLIC BLOOD PRESSURE: 62 MMHG | OXYGEN SATURATION: 98 % | RESPIRATION RATE: 16 BRPM | SYSTOLIC BLOOD PRESSURE: 110 MMHG | TEMPERATURE: 96.1 F | BODY MASS INDEX: 19.22 KG/M2 | HEART RATE: 61 BPM

## 2018-04-23 DIAGNOSIS — R19.7 DIARRHEA, UNSPECIFIED TYPE: Primary | ICD-10-CM

## 2018-04-23 LAB
C DIFF TOX B STL QL: NEGATIVE
LACTOFERRIN STL QL IA: POSITIVE
SPECIMEN SOURCE: NORMAL

## 2018-04-23 PROCEDURE — 87046 STOOL CULTR AEROBIC BACT EA: CPT | Mod: ZL | Performed by: FAMILY MEDICINE

## 2018-04-23 PROCEDURE — 87493 C DIFF AMPLIFIED PROBE: CPT | Mod: ZL | Performed by: FAMILY MEDICINE

## 2018-04-23 PROCEDURE — G0463 HOSPITAL OUTPT CLINIC VISIT: HCPCS

## 2018-04-23 PROCEDURE — 99213 OFFICE O/P EST LOW 20 MIN: CPT | Performed by: FAMILY MEDICINE

## 2018-04-23 PROCEDURE — 87899 AGENT NOS ASSAY W/OPTIC: CPT | Mod: ZL | Performed by: FAMILY MEDICINE

## 2018-04-23 PROCEDURE — 87045 FECES CULTURE AEROBIC BACT: CPT | Mod: ZL | Performed by: FAMILY MEDICINE

## 2018-04-23 PROCEDURE — 87015 SPECIMEN INFECT AGNT CONCNTJ: CPT | Mod: ZL | Performed by: FAMILY MEDICINE

## 2018-04-23 PROCEDURE — 87328 CRYPTOSPORIDIUM AG IA: CPT | Mod: ZL | Performed by: FAMILY MEDICINE

## 2018-04-23 PROCEDURE — 83630 LACTOFERRIN FECAL (QUAL): CPT | Mod: ZL | Performed by: FAMILY MEDICINE

## 2018-04-23 PROCEDURE — 87329 GIARDIA AG IA: CPT | Mod: ZL | Performed by: FAMILY MEDICINE

## 2018-04-23 ASSESSMENT — ANXIETY QUESTIONNAIRES
2. NOT BEING ABLE TO STOP OR CONTROL WORRYING: SEVERAL DAYS
5. BEING SO RESTLESS THAT IT IS HARD TO SIT STILL: NOT AT ALL
7. FEELING AFRAID AS IF SOMETHING AWFUL MIGHT HAPPEN: NOT AT ALL
GAD7 TOTAL SCORE: 2
6. BECOMING EASILY ANNOYED OR IRRITABLE: NOT AT ALL
1. FEELING NERVOUS, ANXIOUS, OR ON EDGE: SEVERAL DAYS
3. WORRYING TOO MUCH ABOUT DIFFERENT THINGS: NOT AT ALL
IF YOU CHECKED OFF ANY PROBLEMS ON THIS QUESTIONNAIRE, HOW DIFFICULT HAVE THESE PROBLEMS MADE IT FOR YOU TO DO YOUR WORK, TAKE CARE OF THINGS AT HOME, OR GET ALONG WITH OTHER PEOPLE: NOT DIFFICULT AT ALL

## 2018-04-23 ASSESSMENT — PAIN SCALES - GENERAL: PAINLEVEL: NO PAIN (0)

## 2018-04-23 ASSESSMENT — PATIENT HEALTH QUESTIONNAIRE - PHQ9: 5. POOR APPETITE OR OVEREATING: NOT AT ALL

## 2018-04-23 NOTE — NURSING NOTE
"Chief Complaint   Patient presents with     Diarrhea       Initial /62 (BP Location: Left arm, Patient Position: Sitting, Cuff Size: Adult Regular)  Pulse 61  Temp 96.1  F (35.6  C) (Tympanic)  Resp 16  Ht 5' 4.5\" (1.638 m)  Wt 115 lb 6.4 oz (52.3 kg)  SpO2 98%  BMI 19.5 kg/m2 Estimated body mass index is 19.5 kg/(m^2) as calculated from the following:    Height as of this encounter: 5' 4.5\" (1.638 m).    Weight as of this encounter: 115 lb 6.4 oz (52.3 kg).  Medication Reconciliation: complete   Lyn Batres MA  "

## 2018-04-23 NOTE — PROGRESS NOTES
SUBJECTIVE:                                                    Dena Cedeno is a 67 year old female who presents to clinic today for the following health issues:      Diarrhea      Duration: about a month    Description:       Consistency of stool: watery, runny, loose, yellow and explosive       Blood in stool: no        Number of loose stools past 24 hours: 5    Intensity:  moderate    Accompanying signs and symptoms:       Fever: no        Nausea/vomitting: no        Abdominal pain: YES- once in a while       Weight loss: YES- 2 lbs    History (recent antibiotics or travel/ill contacts/med changes/testing done): none    Precipitating or alleviating factors: unsure, did try the BRAT diet     Therapies tried and outcome: Imodium AD and BRAT diet    Patient notes that she will have cramping and diarrhea in the middle of the night, waking her from sleep        Problem list and histories reviewed & adjusted, as indicated.  Additional history: as documented    Patient Active Problem List   Diagnosis     ACP (advance care planning)     Osteopenia     Anxiety     Insomnia     Past Surgical History:   Procedure Laterality Date     APPENDECTOMY  2012     COLONOSCOPY  10/18/13    8 year follow-up recommended     GYN SURGERY  1978     D AND C     GYN SURGERY  1980    TUBAL LIGATION       Social History   Substance Use Topics     Smoking status: Former Smoker     Smokeless tobacco: Never Used     Alcohol use Yes      Comment: social     Family History   Problem Relation Age of Onset     OSTEOPOROSIS Mother      Colon Cancer Mother 58     CEREBROVASCULAR DISEASE Father      Alzheimer Disease Father      Hypertension Father      Thyroid Cancer Daughter      Dementia Sister      FRONTAL LOBE     Colon Cancer Other      grandfather     Colon Cancer Other      aunt         Current Outpatient Prescriptions   Medication Sig Dispense Refill     Calcium Carbonate (CALCIUM 600 PO) Take 2 tablets by mouth daily        cetirizine  "(ZYRTEC) 10 MG tablet Take 10 mg by mouth daily as needed        Cholecalciferol (VITAMIN D3 PO) Take 4,000 Units by mouth daily        ESTRADIOL 0.1MG/GM CREAM Insert 1 gram vaginally 2 times per week (Patient not taking: Reported on 4/23/2018) 42 g 2     fluticasone (FLONASE) 50 MCG/ACT spray USE ONE TO TWO SPRAY(S) IN EACH NOSTRIL ONCE DAILY 16 g 3     LORazepam (ATIVAN) 1 MG tablet Take 1.5 tablets (1.5 mg) by mouth daily as needed 135 tablet 1     magnesium 250 MG tablet Take 1 tablet by mouth 2 times daily        Multiple Vitamins-Minerals (PRESERVISION AREDS PO) Take 2 capsules by mouth daily       Omega-3 Fatty Acids (FISH OIL) 1200 MG CAPS Take 1 capsule by mouth daily       order for DME Equipment being ordered: thigh high compression stocking, 15-20 mmHg 1 Device 0     Allergies   Allergen Reactions     Seasonal Allergies      Penicillins Rash     Sulfa Drugs Rash       ROS:  Constitutional, HEENT, cardiovascular, pulmonary, gi and gu systems are negative, except as otherwise noted.    OBJECTIVE:     /62 (BP Location: Left arm, Patient Position: Sitting, Cuff Size: Adult Regular)  Pulse 61  Temp 96.1  F (35.6  C) (Tympanic)  Resp 16  Ht 5' 4.5\" (1.638 m)  Wt 115 lb 6.4 oz (52.3 kg)  SpO2 98%  BMI 19.5 kg/m2  Body mass index is 19.5 kg/(m^2).  GENERAL: healthy, alert and no distress  PSYCH: mentation appears normal, affect normal/bright    Diagnostic Test Results:  none     ASSESSMENT/PLAN:     1. Diarrhea, unspecified type  Stool studies ordered.  Low FODMAP diet handout given.  Follow-up with results when available.  - Clostridium difficile toxin B PCR; Future  - Fecal Lactoferrin; Future  - Ova and Parasite Screen; Future  - Stool culture SSCE; Future        Delaney Miller MD  [facility]"

## 2018-04-24 LAB
G LAMBLIA+CRYPTOSP AG STL QL IA: NORMAL
G LAMBLIA+CRYPTOSP AG STL QL IA: NORMAL
SPECIMEN SOURCE: NORMAL

## 2018-04-24 ASSESSMENT — ANXIETY QUESTIONNAIRES: GAD7 TOTAL SCORE: 2

## 2018-04-24 ASSESSMENT — PATIENT HEALTH QUESTIONNAIRE - PHQ9: SUM OF ALL RESPONSES TO PHQ QUESTIONS 1-9: 2

## 2018-04-27 LAB
BACTERIA SPEC CULT: NORMAL
BACTERIA SPEC CULT: NORMAL
E COLI SXT1+2 STL IA: NORMAL
SPECIMEN SOURCE: NORMAL

## 2018-04-30 ENCOUNTER — TELEPHONE (OUTPATIENT)
Dept: FAMILY MEDICINE | Facility: OTHER | Age: 68
End: 2018-04-30

## 2018-04-30 NOTE — TELEPHONE ENCOUNTER
Patient call and is wondering if stomach tenderness upper above navel is normal. It is tender to touch,but not painful.She is wondering if this from the inflammation. Her BM is normal at this time with diet and probiotic.Thank you

## 2018-05-03 ENCOUNTER — TELEPHONE (OUTPATIENT)
Dept: FAMILY MEDICINE | Facility: OTHER | Age: 68
End: 2018-05-03

## 2018-05-03 DIAGNOSIS — R19.7 DIARRHEA, UNSPECIFIED TYPE: Primary | ICD-10-CM

## 2018-05-03 NOTE — TELEPHONE ENCOUNTER
Patient called and would like referral to Gastroenterology.Her diarrhea is back.Started yesterday.Has been following BRAT diet and using probiotics. Color is yellow.Yesterday had small amount of stomach discomfort.Has 3x this AM.Please advise.Thank you

## 2018-05-03 NOTE — TELEPHONE ENCOUNTER
[facility] she has had care with [facility] before.

## 2018-05-07 NOTE — TELEPHONE ENCOUNTER
Per Janeen:   Faxed Demographic, Medication list, Dictation (05/03/18), order/referral to  fax number: [phone]  Tele:[phone]    Pending review facility will be calling patient to schedule

## 2018-05-09 ENCOUNTER — TRANSFERRED RECORDS (OUTPATIENT)
Dept: HEALTH INFORMATION MANAGEMENT | Facility: CLINIC | Age: 68
End: 2018-05-09

## 2018-05-21 ENCOUNTER — TRANSFERRED RECORDS (OUTPATIENT)
Dept: HEALTH INFORMATION MANAGEMENT | Facility: CLINIC | Age: 68
End: 2018-05-21

## 2018-07-10 ENCOUNTER — TRANSFERRED RECORDS (OUTPATIENT)
Dept: HEALTH INFORMATION MANAGEMENT | Facility: CLINIC | Age: 68
End: 2018-07-10

## 2018-07-30 DIAGNOSIS — Z12.31 VISIT FOR SCREENING MAMMOGRAM: Primary | ICD-10-CM

## 2018-08-14 NOTE — PROGRESS NOTES
SUBJECTIVE:   CC: Dena Cedeno is an 67 year old woman who presents for preventive health visit.     Healthy Habits:    Do you get at least three servings of calcium containing foods daily (dairy, green leafy vegetables, etc.)? yes    Amount of exercise or daily activities, outside of work: 6 day(s) per week    Problems taking medications regularly No    Medication side effects: No    Have you had an eye exam in the past two years? yes    Do you see a dentist twice per year? yes    Do you have sleep apnea, excessive snoring or daytime drowsiness?no      Patient did just have her gynecologic exam with Dr. Velazquez at the end of July.  She will be due for repeat DEXA next year.  She did complete mammogram today.    She states she is taking one lorazepam at night on nights she takes it, and she finds this works well.      She does have a history of mole removal with Dermatology in [place].  She states she has numerous small moles similar to the mole that was removed (benign), and she wonders if she should see a Dermatologist.      Today's PHQ-2 Score: No flowsheet data found.    Abuse: Current or Past(Physical, Sexual or Emotional)- No  Do you feel safe in your environment - Yes    Social History   Substance Use Topics     Smoking status: Former Smoker     Quit date: 1/1/1975     Smokeless tobacco: Never Used      Comment: quit smoking in her 20s, about 1 pack-year history total     Alcohol use Yes      Comment: social     If you drink alcohol do you typically have >3 drinks per day or >7 drinks per week? No                     Reviewed orders with patient.  Reviewed health maintenance and updated orders accordingly - Yes  Patient Active Problem List   Diagnosis     ACP (advance care planning)     Osteopenia     Anxiety     Insomnia     Past Surgical History:   Procedure Laterality Date     APPENDECTOMY  2012     COLONOSCOPY  10/18/13    8 year follow-up recommended     COLONOSCOPY  05/21/2018    [facility]     GYN  SURGERY  1978     D AND C     GYN SURGERY  1980    TUBAL LIGATION       Social History   Substance Use Topics     Smoking status: Former Smoker     Quit date: 1/1/1975     Smokeless tobacco: Never Used      Comment: quit smoking in her 20s, about 1 pack-year history total     Alcohol use Yes      Comment: social     Family History   Problem Relation Age of Onset     Osteoperosis Mother      Colon Cancer Mother 58     Cerebrovascular Disease Father      Alzheimer Disease Father      Hypertension Father      Thyroid Cancer Daughter      Dementia Sister      FRONTAL LOBE     Colon Cancer Other      grandfather     Colon Cancer Other      aunt         Current Outpatient Prescriptions   Medication Sig Dispense Refill     LORazepam (ATIVAN) 1 MG tablet Take 1 tablet (1 mg) by mouth daily as needed 90 tablet 1     Calcium Carbonate (CALCIUM 600 PO) Take 2 tablets by mouth daily        cetirizine (ZYRTEC) 10 MG tablet Take 10 mg by mouth daily as needed        Cholecalciferol (VITAMIN D3 PO) Take 4,000 Units by mouth daily        ESTRADIOL 0.1MG/GM CREAM Insert 1 gram vaginally 2 times per week (Patient not taking: Reported on 4/23/2018) 42 g 2     fluticasone (FLONASE) 50 MCG/ACT spray USE ONE TO TWO SPRAY(S) IN EACH NOSTRIL ONCE DAILY 16 g 3     magnesium 250 MG tablet Take 1 tablet by mouth 2 times daily        Multiple Vitamins-Minerals (PRESERVISION AREDS PO) Take 2 capsules by mouth daily       Omega-3 Fatty Acids (FISH OIL) 1200 MG CAPS Take 1 capsule by mouth daily       order for DME Equipment being ordered: thigh high compression stocking, 15-20 mmHg 1 Device 0     Allergies   Allergen Reactions     Seasonal Allergies      Penicillins Rash     Sulfa Drugs Rash       Patient over age 50, mutual decision to screen reflected in health maintenance.    Pertinent mammograms are reviewed under the imaging tab.  History of abnormal Pap smear: NO - age 65 - see link Cervical Cytology Screening Guidelines     Reviewed and  "updated as needed this visit by clinical staff  Tobacco  Allergies  Meds  Med Hx  Surg Hx  Fam Hx  Soc Hx        Reviewed and updated as needed this visit by Provider            ROS:  CONSTITUTIONAL: NEGATIVE for fever, chills, change in weight  INTEGUMENTARY/SKIN: NEGATIVE for worrisome rashes, moles or lesions  EYES: NEGATIVE for vision changes or irritation  ENT: NEGATIVE for ear, mouth and throat problems  RESP: NEGATIVE for significant cough or SOB  BREAST: NEGATIVE for masses, tenderness or discharge  CV: NEGATIVE for chest pain, palpitations or peripheral edema  GI: NEGATIVE for nausea, abdominal pain, heartburn, or change in bowel habits  : NEGATIVE for unusual urinary or vaginal symptoms. No vaginal bleeding.  MUSCULOSKELETAL: NEGATIVE for significant arthralgias or myalgia  NEURO: NEGATIVE for weakness, dizziness or paresthesias  PSYCHIATRIC: NEGATIVE for changes in mood or affect     OBJECTIVE:   /60  Pulse 58  Resp 19  Ht 5' 4.5\" (1.638 m)  Wt 115 lb (52.2 kg)  SpO2 97%  Breastfeeding? No  BMI 19.43 kg/m2  EXAM:  GENERAL: healthy, alert and no distress  EYES: Eyes grossly normal to inspection, PERRL and conjunctivae and sclerae normal  HENT: ear canals and TM's normal, nose and mouth without ulcers or lesions  NECK: no adenopathy  RESP: lungs clear to auscultation - no rales, rhonchi or wheezes  CV: regular rate and rhythm, normal S1 S2, no S3 or S4, no murmur, click or rub, no peripheral edema and peripheral pulses strong  ABDOMEN: soft, nontender, no hepatosplenomegaly, no masses and bowel sounds normal  MS: no gross musculoskeletal defects noted, no edema  SKIN: no suspicious lesions or rashes  NEURO: Normal strength and tone, mentation intact and speech normal  PSYCH: mentation appears normal, affect normal/bright    Diagnostic Test Results:  none     ASSESSMENT/PLAN:       ICD-10-CM    1. Osteopenia of multiple sites M85.89 Lipid Profile     Glucose     TSH   2. Anxiety F41.9 " "LORazepam (ATIVAN) 1 MG tablet     Lipid Profile     Glucose     TSH   3. Change in mole L81.9 DERMATOLOGY REFERRAL   4. Family history of hypercholesterolemia  Z83.42 Lipid Profile   5. Encounter for screening for diabetes mellitus  Z13.1 Glucose       COUNSELING:   Reviewed preventive health counseling, as reflected in patient instructions    BP Readings from Last 1 Encounters:   08/15/18 108/60     Estimated body mass index is 19.43 kg/(m^2) as calculated from the following:    Height as of this encounter: 5' 4.5\" (1.638 m).    Weight as of this encounter: 115 lb (52.2 kg).           reports that she quit smoking about 43 years ago. She has never used smokeless tobacco.      Counseling Resources:  ATP IV Guidelines  Pooled Cohorts Equation Calculator  Breast Cancer Risk Calculator  FRAX Risk Assessment  ICSI Preventive Guidelines  Dietary Guidelines for Americans, 2010  USDA's MyPlate  ASA Prophylaxis  Lung CA Screening    Delaney Miller MD  [facility]  "

## 2018-08-15 ENCOUNTER — TELEPHONE (OUTPATIENT)
Dept: FAMILY MEDICINE | Facility: OTHER | Age: 68
End: 2018-08-15

## 2018-08-15 ENCOUNTER — RADIANT APPOINTMENT (OUTPATIENT)
Dept: MAMMOGRAPHY | Facility: OTHER | Age: 68
End: 2018-08-15
Attending: FAMILY MEDICINE
Payer: MEDICARE

## 2018-08-15 ENCOUNTER — OFFICE VISIT (OUTPATIENT)
Dept: FAMILY MEDICINE | Facility: OTHER | Age: 68
End: 2018-08-15
Attending: FAMILY MEDICINE
Payer: COMMERCIAL

## 2018-08-15 VITALS
HEIGHT: 65 IN | WEIGHT: 115 LBS | RESPIRATION RATE: 19 BRPM | DIASTOLIC BLOOD PRESSURE: 60 MMHG | BODY MASS INDEX: 19.16 KG/M2 | HEART RATE: 58 BPM | SYSTOLIC BLOOD PRESSURE: 108 MMHG | OXYGEN SATURATION: 97 %

## 2018-08-15 DIAGNOSIS — M85.89 OSTEOPENIA OF MULTIPLE SITES: Primary | ICD-10-CM

## 2018-08-15 DIAGNOSIS — Z12.31 VISIT FOR SCREENING MAMMOGRAM: ICD-10-CM

## 2018-08-15 DIAGNOSIS — F41.9 ANXIETY: ICD-10-CM

## 2018-08-15 DIAGNOSIS — D22.9 CHANGE IN MOLE: ICD-10-CM

## 2018-08-15 DIAGNOSIS — Z13.1 ENCOUNTER FOR SCREENING FOR DIABETES MELLITUS: ICD-10-CM

## 2018-08-15 DIAGNOSIS — Z83.42 FAMILY HISTORY OF HYPERCHOLESTEROLEMIA: ICD-10-CM

## 2018-08-15 PROCEDURE — 99397 PER PM REEVAL EST PAT 65+ YR: CPT | Performed by: FAMILY MEDICINE

## 2018-08-15 PROCEDURE — G0463 HOSPITAL OUTPT CLINIC VISIT: HCPCS

## 2018-08-15 PROCEDURE — 77067 SCR MAMMO BI INCL CAD: CPT | Mod: TC

## 2018-08-15 RX ORDER — LORAZEPAM 1 MG/1
1 TABLET ORAL DAILY PRN
Qty: 90 TABLET | Refills: 1 | Status: SHIPPED | OUTPATIENT
Start: 2018-08-15 | End: 2019-02-04

## 2018-08-15 ASSESSMENT — ANXIETY QUESTIONNAIRES
1. FEELING NERVOUS, ANXIOUS, OR ON EDGE: SEVERAL DAYS
7. FEELING AFRAID AS IF SOMETHING AWFUL MIGHT HAPPEN: NOT AT ALL
4. TROUBLE RELAXING: NOT AT ALL
5. BEING SO RESTLESS THAT IT IS HARD TO SIT STILL: NOT AT ALL
2. NOT BEING ABLE TO STOP OR CONTROL WORRYING: NOT AT ALL
3. WORRYING TOO MUCH ABOUT DIFFERENT THINGS: NOT AT ALL
GAD7 TOTAL SCORE: 1
IF YOU CHECKED OFF ANY PROBLEMS ON THIS QUESTIONNAIRE, HOW DIFFICULT HAVE THESE PROBLEMS MADE IT FOR YOU TO DO YOUR WORK, TAKE CARE OF THINGS AT HOME, OR GET ALONG WITH OTHER PEOPLE: NOT DIFFICULT AT ALL
6. BECOMING EASILY ANNOYED OR IRRITABLE: NOT AT ALL

## 2018-08-15 ASSESSMENT — PAIN SCALES - GENERAL: PAINLEVEL: NO PAIN (0)

## 2018-08-15 NOTE — NURSING NOTE
"Chief Complaint   Patient presents with     Physical       Initial /60  Pulse 58  Resp 19  Ht 5' 4.5\" (1.638 m)  Wt 115 lb (52.2 kg)  SpO2 97%  Breastfeeding? No  BMI 19.43 kg/m2 Estimated body mass index is 19.43 kg/(m^2) as calculated from the following:    Height as of this encounter: 5' 4.5\" (1.638 m).    Weight as of this encounter: 115 lb (52.2 kg).  Medication Reconciliation: complete    Kathi Jiang LPN    "

## 2018-08-15 NOTE — MR AVS SNAPSHOT
After Visit Summary   8/15/2018    Dena Cedeno    MRN: [id number]           Patient Information     Date Of Birth          1950        Visit Information        Provider Department      8/15/2018 2:30 PM Delaney Miller MD [facility]        Today's Diagnoses     Osteopenia of multiple sites    -  1    Anxiety        Change in mole        Family history of hypercholesterolemia         Encounter for screening for diabetes mellitus           Care Instructions      Preventive Health Recommendations  Female Ages 65 +    Yearly exam:     See your health care provider every year in order to  o Review health changes.   o Discuss preventive care.    o Review your medicines if your doctor has prescribed any.      You no longer need a yearly Pap test unless you've had an abnormal Pap test in the past 10 years. If you have vaginal symptoms, such as bleeding or discharge, be sure to talk with your provider about a Pap test.      Every 1 to 2 years, have a mammogram.  If you are over 69, talk with your health care provider about whether or not you want to continue having screening mammograms.      Every 10 years, have a colonoscopy. Or, have a yearly FIT test (stool test). These exams will check for colon cancer.       Have a cholesterol test every 5 years, or more often if your doctor advises it.       Have a diabetes test (fasting glucose) every three years. If you are at risk for diabetes, you should have this test more often.       At age 65, have a bone density scan (DEXA) to check for osteoporosis (brittle bone disease).    Shots:    Get a flu shot each year.    Get a tetanus shot every 10 years.    Talk to your doctor about your pneumonia vaccines. There are now two you should receive - Pneumovax (PPSV 23) and Prevnar (PCV 13).    Talk to your pharmacist about the shingles vaccine.    Talk to your doctor about the hepatitis B vaccine.    Nutrition:     Eat at least 5 servings of  fruits and vegetables each day.      Eat whole-grain bread, whole-wheat pasta and brown rice instead of white grains and rice.      Get adequate about Calcium and Vitamin D.     Lifestyle    Exercise at least 150 minutes a week (30 minutes a day, 5 days a week). This will help you control your weight and prevent disease.      Limit alcohol to one drink per day.      No smoking.       Wear sunscreen to prevent skin cancer.       See your dentist twice a year for an exam and cleaning.      See your eye doctor every 1 to 2 years to screen for conditions such as glaucoma, macular degeneration, cataracts, etc           Follow-ups after your visit        Additional Services     DERMATOLOGY REFERRAL       Your provider has referred you to: [facility] for general mole check    Please be aware that coverage of these services is subject to the terms and limitations of your health insurance plan.  Call member services at your health plan with any benefit or coverage questions.      Please bring the following with you to your appointment:    (1) Any X-Rays, CTs or MRIs which have been performed.  Contact the facility where they were done to arrange for  prior to your scheduled appointment.    (2) List of current medications  (3) This referral request   (4) Any documents/labs given to you for this referral                  Follow-up notes from your care team     Return in about 1 year (around 8/15/2019).      Future tests that were ordered for you today     Open Future Orders        Priority Expected Expires Ordered    Lipid Profile Routine 8/21/2018 8/15/2019 8/15/2018    Glucose Routine 8/21/2018 8/15/2019 8/15/2018    TSH Routine 8/21/2018 8/15/2019 8/15/2018            Who to contact     If you have questions or need follow up information about today's clinic visit or your schedule please contact [facility] directly at [phone].  Normal or non-critical lab and imaging results will be  "communicated to you by [employer], letter or phone within 4 business days after the clinic has received the results. If you do not hear from us within 7 days, please contact the clinic through [employer] or phone. If you have a critical or abnormal lab result, we will notify you by phone as soon as possible.  Submit refill requests through [employer] or call your pharmacy and they will forward the refill request to us. Please allow 3 business days for your refill to be completed.          Additional Information About Your Visit        [employer] Information     [employer] gives you secure access to your electronic health record. If you see a primary care provider, you can also send messages to your care team and make appointments. If you have questions, please call your primary care clinic.  If you do not have a primary care provider, please call [phone] and they will assist you.        Care EveryWhere ID     This is your Care EveryWhere ID. This could be used by other organizations to access your [place] medical records  [id number]        Your Vitals Were     Pulse Respirations Height Pulse Oximetry Breastfeeding? BMI (Body Mass Index)    58 19 5' 4.5\" (1.638 m) 97% No 19.43 kg/m2       Blood Pressure from Last 3 Encounters:   08/15/18 108/60   04/23/18 110/62   03/12/18 100/66    Weight from Last 3 Encounters:   08/15/18 115 lb (52.2 kg)   04/23/18 115 lb 6.4 oz (52.3 kg)   03/12/18 118 lb (53.5 kg)              We Performed the Following     DERMATOLOGY REFERRAL          Today's Medication Changes          These changes are accurate as of 8/15/18  3:00 PM.  If you have any questions, ask your nurse or doctor.               These medicines have changed or have updated prescriptions.        Dose/Directions    LORazepam 1 MG tablet   Commonly known as:  ATIVAN   This may have changed:  how much to take   Used for:  Anxiety   Changed by:  Delaney Miller MD        Dose:  1 mg   Take 1 tablet (1 mg) by mouth daily as " needed   Quantity:  90 tablet   Refills:  1            Where to get your medicines      Some of these will need a paper prescription and others can be bought over the counter.  Ask your nurse if you have questions.     Bring a paper prescription for each of these medications     LORazepam 1 MG tablet                Primary Care Provider Office Phone # Fax #    Delaney Miller [phone] [address] [facility] [address]        Equal Access to Services     CASSANDRA RASHEED : Hadii paulie michaud hadasho Soomaali, waaxda luqadaha, qaybta kaalmada adeegyada, elizabeth guido hayuvaldo jones . So [facility] [phone].    ATENCIÓN: Si habla español, tiene a mon disposición servicios gratuitos de asistencia lingüística. Deuce al [phone].    We comply with applicable federal civil rights laws and [place] laws. We do not discriminate on the basis of race, color, national origin, age, disability, sex, sexual orientation, or gender identity.            Thank you!     Thank you for choosing [facility]  for your care. Our goal is always to provide you with excellent care. Hearing back from our patients is one way we can continue to improve our services. Please take a few minutes to complete the written survey that you may receive in the mail after your visit with us. Thank you!             Your Updated Medication List - Protect others around you: Learn how to safely use, store and throw away your medicines at www.disposemymeds.org.          This list is accurate as of 8/15/18  3:00 PM.  Always use your most recent med list.                   Brand Name Dispense Instructions for use Diagnosis    CALCIUM 600 PO      Take 2 tablets by mouth daily        cetirizine 10 MG tablet    zyrTEC     Take 10 mg by mouth daily as needed        ESTRADIOL 0.1MG/GM CREAM     42 g    Insert 1 gram vaginally 2 times per week    Vaginal atrophy       fish Oil 1200 MG capsule      Take 1  capsule by mouth daily        fluticasone 50 MCG/ACT spray    FLONASE    16 g    USE ONE TO TWO SPRAY(S) IN EACH NOSTRIL ONCE DAILY    Acute sinusitis with symptoms > 10 days       LORazepam 1 MG tablet    ATIVAN    90 tablet    Take 1 tablet (1 mg) by mouth daily as needed    Anxiety       magnesium 250 MG tablet      Take 1 tablet by mouth 2 times daily        order for DME     1 Device    Equipment being ordered: thigh high compression stocking, 15-20 mmHg    Varicose veins of left lower extremity       PRESERVISION AREDS PO      Take 2 capsules by mouth daily        VITAMIN D3 PO      Take 4,000 Units by mouth daily

## 2018-08-15 NOTE — TELEPHONE ENCOUNTER
Patient called and is looking for Ativan RX.Was at pharmacy and is not there.She says she does not have in her paperwork.Please fax.

## 2018-08-16 ASSESSMENT — ANXIETY QUESTIONNAIRES: GAD7 TOTAL SCORE: 1

## 2018-08-16 ASSESSMENT — PATIENT HEALTH QUESTIONNAIRE - PHQ9: SUM OF ALL RESPONSES TO PHQ QUESTIONS 1-9: 1

## 2018-08-17 DIAGNOSIS — M85.89 OSTEOPENIA OF MULTIPLE SITES: ICD-10-CM

## 2018-08-17 DIAGNOSIS — Z83.42 FAMILY HISTORY OF HYPERCHOLESTEROLEMIA: ICD-10-CM

## 2018-08-17 DIAGNOSIS — Z13.1 ENCOUNTER FOR SCREENING FOR DIABETES MELLITUS: ICD-10-CM

## 2018-08-17 DIAGNOSIS — F41.9 ANXIETY: ICD-10-CM

## 2018-08-17 LAB
CHOLEST SERPL-MCNC: 211 MG/DL
GLUCOSE SERPL-MCNC: 93 MG/DL (ref 70–99)
HDLC SERPL-MCNC: 93 MG/DL
LDLC SERPL CALC-MCNC: 108 MG/DL
NONHDLC SERPL-MCNC: 118 MG/DL
TRIGL SERPL-MCNC: 49 MG/DL
TSH SERPL DL<=0.005 MIU/L-ACNC: 1.39 MU/L (ref 0.4–4)

## 2018-08-17 PROCEDURE — 82947 ASSAY GLUCOSE BLOOD QUANT: CPT | Mod: ZL | Performed by: FAMILY MEDICINE

## 2018-08-17 PROCEDURE — 84443 ASSAY THYROID STIM HORMONE: CPT | Mod: ZL | Performed by: FAMILY MEDICINE

## 2018-08-17 PROCEDURE — 36415 COLL VENOUS BLD VENIPUNCTURE: CPT | Mod: ZL | Performed by: FAMILY MEDICINE

## 2018-08-17 PROCEDURE — 80061 LIPID PANEL: CPT | Mod: ZL | Performed by: FAMILY MEDICINE

## 2018-09-20 ENCOUNTER — TRANSFERRED RECORDS (OUTPATIENT)
Dept: HEALTH INFORMATION MANAGEMENT | Facility: CLINIC | Age: 68
End: 2018-09-20

## 2018-10-23 ENCOUNTER — OFFICE VISIT (OUTPATIENT)
Dept: FAMILY MEDICINE | Facility: OTHER | Age: 68
End: 2018-10-23
Attending: FAMILY MEDICINE
Payer: COMMERCIAL

## 2018-10-23 VITALS
OXYGEN SATURATION: 99 % | TEMPERATURE: 96.8 F | RESPIRATION RATE: 16 BRPM | DIASTOLIC BLOOD PRESSURE: 64 MMHG | HEIGHT: 65 IN | WEIGHT: 116.8 LBS | HEART RATE: 66 BPM | SYSTOLIC BLOOD PRESSURE: 106 MMHG | BODY MASS INDEX: 19.46 KG/M2

## 2018-10-23 DIAGNOSIS — Z01.818 PREOP GENERAL PHYSICAL EXAM: Primary | ICD-10-CM

## 2018-10-23 DIAGNOSIS — H26.9 CATARACT OF BOTH EYES, UNSPECIFIED CATARACT TYPE: ICD-10-CM

## 2018-10-23 LAB
ERYTHROCYTE [DISTWIDTH] IN BLOOD BY AUTOMATED COUNT: 14 % (ref 10–15)
HCT VFR BLD AUTO: 41 % (ref 35–47)
HGB BLD-MCNC: 13.7 G/DL (ref 11.7–15.7)
MCH RBC QN AUTO: 31.4 PG (ref 26.5–33)
MCHC RBC AUTO-ENTMCNC: 33.4 G/DL (ref 31.5–36.5)
MCV RBC AUTO: 94 FL (ref 78–100)
PLATELET # BLD AUTO: 273 10E9/L (ref 150–450)
RBC # BLD AUTO: 4.36 10E12/L (ref 3.8–5.2)
WBC # BLD AUTO: 6.6 10E9/L (ref 4–11)

## 2018-10-23 PROCEDURE — 93010 ELECTROCARDIOGRAM REPORT: CPT | Performed by: INTERNAL MEDICINE

## 2018-10-23 PROCEDURE — 85027 COMPLETE CBC AUTOMATED: CPT | Mod: ZL | Performed by: FAMILY MEDICINE

## 2018-10-23 PROCEDURE — 93005 ELECTROCARDIOGRAM TRACING: CPT

## 2018-10-23 PROCEDURE — 36415 COLL VENOUS BLD VENIPUNCTURE: CPT | Mod: ZL | Performed by: FAMILY MEDICINE

## 2018-10-23 PROCEDURE — G0463 HOSPITAL OUTPT CLINIC VISIT: HCPCS

## 2018-10-23 PROCEDURE — 99214 OFFICE O/P EST MOD 30 MIN: CPT | Mod: 25 | Performed by: FAMILY MEDICINE

## 2018-10-23 ASSESSMENT — ANXIETY QUESTIONNAIRES
3. WORRYING TOO MUCH ABOUT DIFFERENT THINGS: NOT AT ALL
IF YOU CHECKED OFF ANY PROBLEMS ON THIS QUESTIONNAIRE, HOW DIFFICULT HAVE THESE PROBLEMS MADE IT FOR YOU TO DO YOUR WORK, TAKE CARE OF THINGS AT HOME, OR GET ALONG WITH OTHER PEOPLE: NOT DIFFICULT AT ALL
2. NOT BEING ABLE TO STOP OR CONTROL WORRYING: NOT AT ALL
5. BEING SO RESTLESS THAT IT IS HARD TO SIT STILL: NOT AT ALL
6. BECOMING EASILY ANNOYED OR IRRITABLE: NOT AT ALL
GAD7 TOTAL SCORE: 0
7. FEELING AFRAID AS IF SOMETHING AWFUL MIGHT HAPPEN: NOT AT ALL
1. FEELING NERVOUS, ANXIOUS, OR ON EDGE: NOT AT ALL

## 2018-10-23 ASSESSMENT — PATIENT HEALTH QUESTIONNAIRE - PHQ9: 5. POOR APPETITE OR OVEREATING: NOT AT ALL

## 2018-10-23 ASSESSMENT — PAIN SCALES - GENERAL: PAINLEVEL: NO PAIN (0)

## 2018-10-23 NOTE — PROGRESS NOTES
[facility]  [address]  [place] [address]  [phone]  Dept: [phone]    PRE-OP EVALUATION:  Today's date: 10/23/2018    Dena Cedeno (: 1950) presents for pre-operative evaluation assessment as requested by Dr. Archuleta.  She requires evaluation and anesthesia risk assessment prior to undergoing surgery/procedure for treatment of bilateral cataracts.    Proposed Surgery/ Procedure: Cataract surgery  Date of Surgery/ Procedure: 18 Rt Eye, 18 Left eye  Time of Surgery/ Procedure: [facility]/Surgical Facility: [facility]  Fax number for surgical facility: [phone]  Primary Physician: Delaney Miller  Type of Anesthesia Anticipated: to be determined    Patient has a Health Care Directive or Living Will:  YES -pt will bring with    1. NO - Do you have a history of heart attack, stroke, stent, bypass or surgery on an artery in the head, neck, heart or legs?  2. NO - Do you ever have any pain or discomfort in your chest?  3. NO - Do you have a history of  Heart Failure?  4. NO - Are you troubled by shortness of breath when: walking on the level, up a slight hill or at night?  5. NO - Do you currently have a cold, bronchitis or other respiratory infection?  6. NO - Do you have a cough, shortness of breath or wheezing?  7. NO - Do you sometimes get pains in the calves of your legs when you walk?  8. NO - Do you or anyone in your family have previous history of blood clots?  9. NO - Do you or does anyone in your family have a serious bleeding problem such as prolonged bleeding following surgeries or cuts?  10. NO - Have you ever had problems with anemia or been told to take iron pills?  11. NO - Have you had any abnormal blood loss such as black, tarry or bloody stools, or abnormal vaginal bleeding?  12. NO - Have you ever had a blood transfusion?  13. NO - Have you or any of your relatives ever had problems with anesthesia?  14. NO -  Do you have sleep apnea, excessive snoring or daytime drowsiness?  15. NO - Do you have any prosthetic heart valves?  16. NO - Do you have prosthetic joints?  17. NO - Is there any chance that you may be pregnant?      HPI:     HPI related to upcoming procedure: Bilateral cataracts affecting vision      See problem list for active medical problems.  Problems all longstanding and stable, except as noted/documented.  See ROS for pertinent symptoms related to these conditions.                                                                                                                                                          .    MEDICAL HISTORY:     Patient Active Problem List    Diagnosis Date Noted     Insomnia      Priority: Medium     ACP (advance care planning) 08/23/2016     Priority: Medium     Advance Care Planning 8/23/2016: ACP Review of Chart / Resources Provided:  Reviewed chart for advance care plan.  Dena REVA Cedeno has been provided information and resources to begin or update their advance care plan.  Added by COTY COUGHLIN             Anxiety 08/23/2016     Priority: Medium     Osteopenia      Priority: Medium      Past Medical History:   Diagnosis Date     Actinic keratosis      Anxiety      Insomnia      Osteopenia      Vaginal atrophy      Varicose veins of both lower extremities      Past Surgical History:   Procedure Laterality Date     APPENDECTOMY  2012     COLONOSCOPY  10/18/13    8 year follow-up recommended     [facility]  05/21/2018    [facility]     GYN SURGERY  1978     D AND C     GYN SURGERY  1980    TUBAL LIGATION     Current Outpatient Prescriptions   Medication Sig Dispense Refill     Calcium Carbonate (CALCIUM 600 PO) Take 2 tablets by mouth daily        cetirizine (ZYRTEC) 10 MG tablet Take 10 mg by mouth daily as needed        Cholecalciferol (VITAMIN D3 PO) Take 4,000 Units by mouth daily        fluticasone (FLONASE) 50 MCG/ACT spray USE ONE TO TWO SPRAY(S) IN EACH NOSTRIL  "ONCE DAILY 16 g 3     LORazepam (ATIVAN) 1 MG tablet Take 1 tablet (1 mg) by mouth daily as needed 90 tablet 1     magnesium 250 MG tablet Take 1 tablet by mouth 2 times daily        Multiple Vitamins-Minerals (PRESERVISION AREDS PO) Take 2 capsules by mouth daily       Omega-3 Fatty Acids (FISH OIL) 1200 MG CAPS Take 1 capsule by mouth daily       order for DME Equipment being ordered: thigh high compression stocking, 15-20 mmHg 1 Device 0     OTC products: None, except as noted above    Allergies   Allergen Reactions     Seasonal Allergies      Penicillins Rash     Sulfa Drugs Rash      Latex Allergy: NO    Social History   Substance Use Topics     Smoking status: Former Smoker     Quit date: 1/1/1975     Smokeless tobacco: Never Used      Comment: quit smoking in her 20s, about 1 pack-year history total     Alcohol use Yes      Comment: social     History   Drug Use No       REVIEW OF SYSTEMS:   Constitutional, neuro, ENT, endocrine, pulmonary, cardiac, gastrointestinal, genitourinary, musculoskeletal, integument and psychiatric systems are negative, except as otherwise noted.    EXAM:   /64 (BP Location: Left arm, Patient Position: Sitting, Cuff Size: Adult Regular)  Pulse 66  Temp 96.8  F (36  C) (Tympanic)  Resp 16  Ht 5' 4.5\" (1.638 m)  Wt 116 lb 12.8 oz (53 kg)  SpO2 99%  BMI 19.74 kg/m2    GENERAL APPEARANCE: healthy, alert and no distress     EYES: EOMI, PERRL     HENT: ear canals and TM's normal and nose and mouth without ulcers or lesions     NECK: no adenopathy     RESP: lungs clear to auscultation - no rales, rhonchi or wheezes     CV: regular rates and rhythm, normal S1 S2, no S3 or S4 and no murmur, click or rub     ABDOMEN:  soft, nontender, no HSM or masses and bowel sounds normal     SKIN: no suspicious lesions or rashes     NEURO: Normal strength and tone, sensory exam grossly normal, mentation intact and speech normal     PSYCH: mentation appears normal. and affect " normal/bright    DIAGNOSTICS:     EKG: mild bradycardia, normal axis, normal intervals, no acute ST/T changes c/w ischemia, no LVH by voltage criteria    Labs Resulted Today:   Results for orders placed or performed in visit on 10/23/18   CBC with platelets   Result Value Ref Range    WBC 6.6 4.0 - 11.0 10e9/L    RBC Count 4.36 3.8 - 5.2 10e12/L    Hemoglobin 13.7 11.7 - 15.7 g/dL    Hematocrit 41.0 35.0 - 47.0 %    MCV 94 78 - 100 fl    MCH 31.4 26.5 - 33.0 pg    MCHC 33.4 31.5 - 36.5 g/dL    RDW 14.0 10.0 - 15.0 %    Platelet Count 273 150 - 450 10e9/L       Recent Labs   Lab Test  05/15/17   1152   HGB  13.0   PLT  257        IMPRESSION:   Reason for surgery/procedure: Cataracts  Diagnosis/reason for consult: Cardiopulmonary clearance    The proposed surgical procedure is considered LOW risk.    REVISED CARDIAC RISK INDEX  The patient has the following serious cardiovascular risks for perioperative complications such as (MI, PE, VFib and 3  AV Block):  No serious cardiac risks  INTERPRETATION: 0 risks: Class I (very low risk - 0.4% complication rate)    The patient has the following additional risks for perioperative complications:  No identified additional risks      ICD-10-CM    1. Preop general physical exam Z01.818    2. Cataract of both eyes, unspecified cataract type H26.9        RECOMMENDATIONS:       Cardiovascular Risk  Performs 4 METs exercise without symptoms (Light housework (dusting, washing dishes), Climb a flight of stairs and Walk on level ground at 15 minutes per mile (4 miles/hour)) .           APPROVAL GIVEN to proceed with proposed procedure, without further diagnostic evaluation       Signed Electronically by: Delaney Miller MD    Copy of this evaluation report is provided to requesting physician.    [place] Preop Guidelines    Revised Cardiac Risk Index

## 2018-10-23 NOTE — NURSING NOTE
"Chief Complaint   Patient presents with     Pre-Op Exam       Initial /64 (BP Location: Left arm, Patient Position: Sitting, Cuff Size: Adult Regular)  Pulse 66  Temp 96.8  F (36  C) (Tympanic)  Resp 16  Ht 5' 4.5\" (1.638 m)  Wt 116 lb 12.8 oz (53 kg)  SpO2 99%  BMI 19.74 kg/m2 Estimated body mass index is 19.74 kg/(m^2) as calculated from the following:    Height as of this encounter: 5' 4.5\" (1.638 m).    Weight as of this encounter: 116 lb 12.8 oz (53 kg).  Medication Reconciliation: complete    Lyn Batres MA  "

## 2018-10-23 NOTE — MR AVS SNAPSHOT
After Visit Summary   10/23/2018    Dena Cedeno    MRN: [id number]           Patient Information     Date Of Birth          1950        Visit Information        Provider Department      10/23/2018 11:00 AM Delaney Miller MD [facility]        Today's Diagnoses     Preop general physical exam    -  1    Cataract of both eyes, unspecified cataract type          Care Instructions      Before Your Surgery      Call your surgeon if there is any change in your health. This includes signs of a cold or flu (such as a sore throat, runny nose, cough, rash or fever).    Do not smoke, drink alcohol or take over the counter medicine (unless your surgeon or primary care doctor tells you to) for the 24 hours before and after surgery.    If you take prescribed drugs: Follow your doctor s orders about which medicines to take and which to stop until after surgery.    Eating and drinking prior to surgery: follow the instructions from your surgeon    Take a shower or bath the night before surgery. Use the soap your surgeon gave you to gently clean your skin. If you do not have soap from your surgeon, use your regular soap. Do not shave or scrub the surgery site.  Wear clean pajamas and have clean sheets on your bed.           Follow-ups after your visit        Follow-up notes from your care team     Return if symptoms worsen or fail to improve.      Who to contact     If you have questions or need follow up information about today's clinic visit or your schedule please contact [facility] directly at [phone].  Normal or non-critical lab and imaging results will be communicated to you by MyChart, letter or phone within 4 business days after the clinic has received the results. If you do not hear from us within 7 days, please contact the clinic through MyChart or phone. If you have a critical or abnormal lab result, we will notify you by phone as soon as  "possible.  Submit refill requests through [employer] or call your pharmacy and they will forward the refill request to us. Please allow 3 business days for your refill to be completed.          Additional Information About Your Visit        [email or web address] Information     [employer] gives you secure access to your electronic health record. If you see a primary care provider, you can also send messages to your care team and make appointments. If you have questions, please call your primary care clinic.  If you do not have a primary care provider, please call [phone] and they will assist you.        Care EveryWhere ID     This is your Care EveryWhere ID. This could be used by other organizations to access your [facility] medical records  [id number]        Your Vitals Were     Pulse Temperature Respirations Height Pulse Oximetry BMI (Body Mass Index)    66 96.8  F (36  C) (Tympanic) 16 5' 4.5\" (1.638 m) 99% 19.74 kg/m2       Blood Pressure from Last 3 Encounters:   10/23/18 106/64   08/15/18 108/60   04/23/18 110/62    Weight from Last 3 Encounters:   10/23/18 116 lb 12.8 oz (53 kg)   08/15/18 115 lb (52.2 kg)   04/23/18 115 lb 6.4 oz (52.3 kg)              We Performed the Following     CBC with platelets     EKG 12-lead complete w/read - (Clinic Performed)        Primary Care Provider Office Phone # Fax #    Delaney Miller [phone] [address] [facility] [address]        Equal Access to Services     ELIZABETH RASHEED : Hadii aad ku hadasho Soomaali, waaxda luqadaha, qaybta kaalmada adeegyada, elizabeth jones . So [facility] [phone].    ATENCIÓN: Si habla español, tiene a mon disposición servicios gratuitos de asistencia lingüística. Llame al [phone].    We comply with applicable federal civil rights laws and [place] laws. We do not discriminate on the basis of race, color, national origin, age, disability, sex, sexual orientation, or gender identity.       "      Thank you!     Thank you for choosing [facility]  for your care. Our goal is always to provide you with excellent care. Hearing back from our patients is one way we can continue to improve our services. Please take a few minutes to complete the written survey that you may receive in the mail after your visit with us. Thank you!             Your Updated Medication List - Protect others around you: Learn how to safely use, store and throw away your medicines at www.disposemymeds.org.          This list is accurate as of 10/23/18  1:27 PM.  Always use your most recent med list.                   Brand Name Dispense Instructions for use Diagnosis    CALCIUM 600 PO      Take 2 tablets by mouth daily        cetirizine 10 MG tablet    zyrTEC     Take 10 mg by mouth daily as needed        fish Oil 1200 MG capsule      Take 1 capsule by mouth daily        fluticasone 50 MCG/ACT spray    FLONASE    16 g    USE ONE TO TWO SPRAY(S) IN EACH NOSTRIL ONCE DAILY    Acute sinusitis with symptoms > 10 days       LORazepam 1 MG tablet    ATIVAN    90 tablet    Take 1 tablet (1 mg) by mouth daily as needed    Anxiety       magnesium 250 MG tablet      Take 1 tablet by mouth 2 times daily        order for DME     1 Device    Equipment being ordered: thigh high compression stocking, 15-20 mmHg    Varicose veins of left lower extremity       PRESERVISION AREDS PO      Take 2 capsules by mouth daily        VITAMIN D3 PO      Take 4,000 Units by mouth daily

## 2018-10-24 ASSESSMENT — ANXIETY QUESTIONNAIRES: GAD7 TOTAL SCORE: 0

## 2018-10-24 ASSESSMENT — PATIENT HEALTH QUESTIONNAIRE - PHQ9: SUM OF ALL RESPONSES TO PHQ QUESTIONS 1-9: 0

## 2019-02-04 DIAGNOSIS — F41.9 ANXIETY: ICD-10-CM

## 2019-02-05 RX ORDER — LORAZEPAM 1 MG/1
TABLET ORAL
Qty: 90 TABLET | Refills: 1 | Status: SHIPPED | OUTPATIENT
Start: 2019-02-05 | End: 2019-09-03

## 2019-02-05 NOTE — TELEPHONE ENCOUNTER
LORazepam (ATIVAN) 1 MG tablet      Last Written Prescription Date:  8/15/18  Last Fill Quantity: 90,   # refills: 1  Last Office Visit: 10/23/18  Future Office visit:       Routing refill request to provider for review/approval because:  Drug not on the [facility], [facility] or [facility] refill protocol or controlled substance

## 2019-06-07 NOTE — PROGRESS NOTES
SUBJECTIVE:   Dena Cedeno is a 68 year old female who presents to clinic today for the following   health issues:        Thyroid concerns      Duration: Been concerned for a few months    Description (location/character/radiation): lethargic, dry eyes,feeling down, Eye doctor, muscles are tighter then normal, headaches, nail seem softer then they had, cramps in legs, cold, napping on occasion.     Intensity:  mild    Accompanying signs and symptoms: none    History (similar episodes/previous evaluation): None    Precipitating or alleviating factors: None    Therapies tried and outcome: None    Patient states she has been working with her eye doctor, and no etiology has been found for her dry eyes.  Patient states that her thyroid was mentioned thinking this should be checked.         Additional history: as documented    Reviewed  and updated as needed this visit by clinical staff  Tobacco  Allergies  Meds  Med Hx  Surg Hx  Fam Hx  Soc Hx        Reviewed and updated as needed this visit by Provider         Patient Active Problem List   Diagnosis     ACP (advance care planning)     Osteopenia     Anxiety     Insomnia     Past Surgical History:   Procedure Laterality Date     APPENDECTOMY  2012     COLONOSCOPY  10/18/13    8 year follow-up recommended     [facility]  2018    [facility]     GYN SURGERY       D AND C     GYN SURGERY      TUBAL LIGATION       Social History     Tobacco Use     Smoking status: Former Smoker     Last attempt to quit: 1975     Years since quittin.4     Smokeless tobacco: Never Used     Tobacco comment: quit smoking in her 20s, about 1 pack-year history total   Substance Use Topics     Alcohol use: Yes     Comment: social     Family History   Problem Relation Age of Onset     Osteoporosis Mother      Colon Cancer Mother 58     Cerebrovascular Disease Father      Alzheimer Disease Father      Hypertension Father      Thyroid Cancer Daughter      Dementia  "Sister         FRONTAL LOBE     Colon Cancer Other         grandfather     Colon Cancer Other         aunt         Current Outpatient Medications   Medication Sig Dispense Refill     Calcium Carbonate (CALCIUM 600 PO) Take 2 tablets by mouth daily        cetirizine (ZYRTEC) 10 MG tablet Take 10 mg by mouth daily as needed        Cholecalciferol (VITAMIN D3 PO) Take 4,000 Units by mouth daily        fluticasone (FLONASE) 50 MCG/ACT spray USE ONE TO TWO SPRAY(S) IN EACH NOSTRIL ONCE DAILY 16 g 3     LORazepam (ATIVAN) 1 MG tablet TAKE 1 TABLET BY MOUTH ONCE DAILY AS NEEDED 90 tablet 1     magnesium 250 MG tablet Take 1 tablet by mouth 2 times daily        Multiple Vitamins-Minerals (PRESERVISION AREDS PO) Take 2 capsules by mouth daily       Omega-3 Fatty Acids (FISH OIL) 1200 MG CAPS Take 1 capsule by mouth daily       order for DME Equipment being ordered: thigh high compression stocking, 15-20 mmHg 1 Device 0     Allergies   Allergen Reactions     Seasonal Allergies      Penicillins Rash     Sulfa Drugs Rash       ROS:  Constitutional, HEENT, cardiovascular, pulmonary, gi and gu systems are negative, except as otherwise noted.    OBJECTIVE:                                                    /66 (BP Location: Right arm, Patient Position: Sitting, Cuff Size: Adult Regular)   Pulse 67   Temp 96.4  F (35.8  C) (Tympanic)   Resp 16   Ht 1.638 m (5' 4.5\")   Wt 54.5 kg (120 lb 3.2 oz)   SpO2 99%   BMI 20.31 kg/m    Body mass index is 20.31 kg/m .  GENERAL APPEARANCE: healthy, alert and no distress  NECK: no visible thyroid enlargement  PSYCH: mentation appears normal and affect normal/bright         ASSESSMENT/PLAN:                                                    1. Fatigue, unspecified type  Labs ordered as listed.  Possible etiologies of symptoms discussed.  Follow-up with results when available.  - CBC with platelets  - Comprehensive metabolic panel  - TSH with free T4 reflex  - Ferritin  - Lyme Disease " Arielle with reflex to WB Serum  - Vitamin D Deficiency  - Vitamin B12    2. Dry eyes  As above.  - CBC with platelets  - Comprehensive metabolic panel  - TSH with free T4 reflex  - Ferritin  - Lyme Disease Arielle with reflex to WB Serum  - Vitamin D Deficiency  - Vitamin B12    3. Abnormal finding of blood chemistry   - Ferritin    4. Other long term (current) drug therapy   - Vitamin D Deficiency      Over 30 minutes are spent with the patient, over 20 minutes of which was in education and counseling regarding current conditions and treatment/therapy options/risks/benefits/etc, including lab ordered, differential diagnosis, and future planning.      Delaney Miller MD  [facility]

## 2019-06-12 ENCOUNTER — OFFICE VISIT (OUTPATIENT)
Dept: FAMILY MEDICINE | Facility: OTHER | Age: 69
End: 2019-06-12
Attending: FAMILY MEDICINE
Payer: MEDICARE

## 2019-06-12 VITALS
SYSTOLIC BLOOD PRESSURE: 132 MMHG | RESPIRATION RATE: 16 BRPM | HEIGHT: 65 IN | BODY MASS INDEX: 20.03 KG/M2 | HEART RATE: 67 BPM | DIASTOLIC BLOOD PRESSURE: 66 MMHG | WEIGHT: 120.2 LBS | OXYGEN SATURATION: 99 % | TEMPERATURE: 96.4 F

## 2019-06-12 DIAGNOSIS — R79.9 ABNORMAL FINDING OF BLOOD CHEMISTRY: ICD-10-CM

## 2019-06-12 DIAGNOSIS — Z79.899 OTHER LONG TERM (CURRENT) DRUG THERAPY: ICD-10-CM

## 2019-06-12 DIAGNOSIS — H04.123 DRY EYES: ICD-10-CM

## 2019-06-12 DIAGNOSIS — R53.83 FATIGUE, UNSPECIFIED TYPE: Primary | ICD-10-CM

## 2019-06-12 LAB
ALBUMIN SERPL-MCNC: 4 G/DL (ref 3.4–5)
ALP SERPL-CCNC: 89 U/L (ref 40–150)
ALT SERPL W P-5'-P-CCNC: 24 U/L (ref 0–50)
ANION GAP SERPL CALCULATED.3IONS-SCNC: 7 MMOL/L (ref 3–14)
AST SERPL W P-5'-P-CCNC: 21 U/L (ref 0–45)
BILIRUB SERPL-MCNC: 0.2 MG/DL (ref 0.2–1.3)
BUN SERPL-MCNC: 21 MG/DL (ref 7–30)
CALCIUM SERPL-MCNC: 8.9 MG/DL (ref 8.5–10.1)
CHLORIDE SERPL-SCNC: 105 MMOL/L (ref 94–109)
CO2 SERPL-SCNC: 27 MMOL/L (ref 20–32)
CREAT SERPL-MCNC: 0.72 MG/DL (ref 0.52–1.04)
ERYTHROCYTE [DISTWIDTH] IN BLOOD BY AUTOMATED COUNT: 13.6 % (ref 10–15)
FERRITIN SERPL-MCNC: 65 NG/ML (ref 8–252)
GFR SERPL CREATININE-BSD FRML MDRD: 86 ML/MIN/{1.73_M2}
GLUCOSE SERPL-MCNC: 88 MG/DL (ref 70–99)
HCT VFR BLD AUTO: 39.5 % (ref 35–47)
HGB BLD-MCNC: 13.4 G/DL (ref 11.7–15.7)
MCH RBC QN AUTO: 31.8 PG (ref 26.5–33)
MCHC RBC AUTO-ENTMCNC: 33.9 G/DL (ref 31.5–36.5)
MCV RBC AUTO: 94 FL (ref 78–100)
PLATELET # BLD AUTO: 272 10E9/L (ref 150–450)
POTASSIUM SERPL-SCNC: 3.8 MMOL/L (ref 3.4–5.3)
PROT SERPL-MCNC: 7.2 G/DL (ref 6.8–8.8)
RBC # BLD AUTO: 4.22 10E12/L (ref 3.8–5.2)
SODIUM SERPL-SCNC: 139 MMOL/L (ref 133–144)
TSH SERPL DL<=0.005 MIU/L-ACNC: 1.29 MU/L (ref 0.4–4)
WBC # BLD AUTO: 5.8 10E9/L (ref 4–11)

## 2019-06-12 PROCEDURE — 86618 LYME DISEASE ANTIBODY: CPT | Mod: ZL | Performed by: FAMILY MEDICINE

## 2019-06-12 PROCEDURE — 82306 VITAMIN D 25 HYDROXY: CPT | Mod: ZL | Performed by: FAMILY MEDICINE

## 2019-06-12 PROCEDURE — 82728 ASSAY OF FERRITIN: CPT | Mod: ZL | Performed by: FAMILY MEDICINE

## 2019-06-12 PROCEDURE — 99000 SPECIMEN HANDLING OFFICE-LAB: CPT | Performed by: FAMILY MEDICINE

## 2019-06-12 PROCEDURE — 99214 OFFICE O/P EST MOD 30 MIN: CPT | Performed by: FAMILY MEDICINE

## 2019-06-12 PROCEDURE — 80053 COMPREHEN METABOLIC PANEL: CPT | Mod: ZL | Performed by: FAMILY MEDICINE

## 2019-06-12 PROCEDURE — 84443 ASSAY THYROID STIM HORMONE: CPT | Mod: ZL | Performed by: FAMILY MEDICINE

## 2019-06-12 PROCEDURE — G0463 HOSPITAL OUTPT CLINIC VISIT: HCPCS

## 2019-06-12 PROCEDURE — 82607 VITAMIN B-12: CPT | Mod: ZL | Performed by: FAMILY MEDICINE

## 2019-06-12 PROCEDURE — 85027 COMPLETE CBC AUTOMATED: CPT | Mod: ZL | Performed by: FAMILY MEDICINE

## 2019-06-12 PROCEDURE — 36415 COLL VENOUS BLD VENIPUNCTURE: CPT | Mod: ZL | Performed by: FAMILY MEDICINE

## 2019-06-12 ASSESSMENT — ANXIETY QUESTIONNAIRES
IF YOU CHECKED OFF ANY PROBLEMS ON THIS QUESTIONNAIRE, HOW DIFFICULT HAVE THESE PROBLEMS MADE IT FOR YOU TO DO YOUR WORK, TAKE CARE OF THINGS AT HOME, OR GET ALONG WITH OTHER PEOPLE: NOT DIFFICULT AT ALL
5. BEING SO RESTLESS THAT IT IS HARD TO SIT STILL: NOT AT ALL
7. FEELING AFRAID AS IF SOMETHING AWFUL MIGHT HAPPEN: NOT AT ALL
GAD7 TOTAL SCORE: 2
6. BECOMING EASILY ANNOYED OR IRRITABLE: NOT AT ALL
1. FEELING NERVOUS, ANXIOUS, OR ON EDGE: SEVERAL DAYS
2. NOT BEING ABLE TO STOP OR CONTROL WORRYING: SEVERAL DAYS
3. WORRYING TOO MUCH ABOUT DIFFERENT THINGS: NOT AT ALL

## 2019-06-12 ASSESSMENT — PATIENT HEALTH QUESTIONNAIRE - PHQ9
SUM OF ALL RESPONSES TO PHQ QUESTIONS 1-9: 2
5. POOR APPETITE OR OVEREATING: NOT AT ALL

## 2019-06-12 ASSESSMENT — PAIN SCALES - GENERAL: PAINLEVEL: NO PAIN (0)

## 2019-06-12 ASSESSMENT — MIFFLIN-ST. JEOR: SCORE: 1068.16

## 2019-06-12 NOTE — NURSING NOTE
"Chief Complaint   Patient presents with     Thyroid Problem       Initial /66 (BP Location: Right arm, Patient Position: Sitting, Cuff Size: Adult Regular)   Pulse 67   Temp 96.4  F (35.8  C) (Tympanic)   Resp 16   Ht 1.638 m (5' 4.5\")   Wt 54.5 kg (120 lb 3.2 oz)   SpO2 99%   BMI 20.31 kg/m   Estimated body mass index is 20.31 kg/m  as calculated from the following:    Height as of this encounter: 1.638 m (5' 4.5\").    Weight as of this encounter: 54.5 kg (120 lb 3.2 oz).  Medication Reconciliation: complete    Lyn Batres MA  "

## 2019-06-13 LAB — VIT B12 SERPL-MCNC: 560 PG/ML (ref 193–986)

## 2019-06-13 ASSESSMENT — ANXIETY QUESTIONNAIRES: GAD7 TOTAL SCORE: 2

## 2019-06-14 LAB
B BURGDOR IGG+IGM SER QL: 0.06 (ref 0–0.89)
DEPRECATED CALCIDIOL+CALCIFEROL SERPL-MC: >155 UG/L (ref 20–75)

## 2019-06-15 ENCOUNTER — TRANSFERRED RECORDS (OUTPATIENT)
Dept: HEALTH INFORMATION MANAGEMENT | Facility: CLINIC | Age: 69
End: 2019-06-15

## 2019-06-19 ENCOUNTER — OFFICE VISIT (OUTPATIENT)
Dept: FAMILY MEDICINE | Facility: OTHER | Age: 69
End: 2019-06-19
Attending: FAMILY MEDICINE
Payer: COMMERCIAL

## 2019-06-19 VITALS
HEIGHT: 65 IN | OXYGEN SATURATION: 97 % | SYSTOLIC BLOOD PRESSURE: 102 MMHG | HEART RATE: 65 BPM | RESPIRATION RATE: 14 BRPM | TEMPERATURE: 97.5 F | DIASTOLIC BLOOD PRESSURE: 62 MMHG | WEIGHT: 118 LBS | BODY MASS INDEX: 19.66 KG/M2

## 2019-06-19 DIAGNOSIS — J01.90 ACUTE SINUSITIS WITH SYMPTOMS > 10 DAYS: Primary | ICD-10-CM

## 2019-06-19 DIAGNOSIS — H61.21 CERUMINOSIS, RIGHT: ICD-10-CM

## 2019-06-19 PROCEDURE — 99214 OFFICE O/P EST MOD 30 MIN: CPT | Performed by: FAMILY MEDICINE

## 2019-06-19 PROCEDURE — G0463 HOSPITAL OUTPT CLINIC VISIT: HCPCS

## 2019-06-19 RX ORDER — LEVOFLOXACIN 500 MG/1
TABLET, FILM COATED ORAL
Refills: 0 | COMMUNITY
Start: 2019-06-16 | End: 2019-06-19

## 2019-06-19 RX ORDER — CEFPROZIL 500 MG/1
500 TABLET, FILM COATED ORAL 2 TIMES DAILY
Qty: 20 TABLET | Refills: 0 | Status: SHIPPED | OUTPATIENT
Start: 2019-06-19 | End: 2019-07-01

## 2019-06-19 ASSESSMENT — MIFFLIN-ST. JEOR: SCORE: 1058.18

## 2019-06-19 ASSESSMENT — PAIN SCALES - GENERAL: PAINLEVEL: MILD PAIN (2)

## 2019-06-19 NOTE — PROGRESS NOTES
SUBJECTIVE:   Dena Cedeno is a 68 year old female who presents to clinic today for the following   health issues:        ED/UC Followup:    Facility:  [facility]  Date of visit: 6/15/19  Reason for visit: headache  Current Status: headache persists, not as bad. Antibiotics ordered, patient reports not taking due to concern for side effects (she read about tendon issues with levaquin).     Patient also feels that her right ear is plugged and wonders about having is cleaned.        Additional history: as documented    Reviewed  and updated as needed this visit by clinical staff         Reviewed and updated as needed this visit by Provider         Patient Active Problem List   Diagnosis     ACP (advance care planning)     Osteopenia     Anxiety     Insomnia     Past Surgical History:   Procedure Laterality Date     APPENDECTOMY  2012     COLONOSCOPY  10/18/13    8 year follow-up recommended     [facility]OSCOPY  2018    [facility]     GYN SURGERY       D AND C     GYN SURGERY      TUBAL LIGATION       Social History     Tobacco Use     Smoking status: Former Smoker     Last attempt to quit: 1975     Years since quittin.5     Smokeless tobacco: Never Used     Tobacco comment: quit smoking in her 20s, about 1 pack-year history total   Substance Use Topics     Alcohol use: Yes     Comment: social     Family History   Problem Relation Age of Onset     Osteoporosis Mother      Colon Cancer Mother 58     Cerebrovascular Disease Father      Alzheimer Disease Father      Hypertension Father      Thyroid Cancer Daughter      Dementia Sister         FRONTAL LOBE     Colon Cancer Other         grandfather     Colon Cancer Other         aunt         Current Outpatient Medications   Medication Sig Dispense Refill     Calcium Carbonate (CALCIUM 600 PO) Take 2 tablets by mouth daily        cefPROZIL (CEFZIL) 500 MG tablet Take 1 tablet (500 mg) by mouth 2 times daily for 10 days 20 tablet 0      "cetirizine (ZYRTEC) 10 MG tablet Take 10 mg by mouth daily as needed        LORazepam (ATIVAN) 1 MG tablet TAKE 1 TABLET BY MOUTH ONCE DAILY AS NEEDED 90 tablet 1     magnesium 250 MG tablet Take 1 tablet by mouth 2 times daily        Multiple Vitamins-Minerals (PRESERVISION AREDS PO) Take 2 capsules by mouth daily       Omega-3 Fatty Acids (FISH OIL) 1200 MG CAPS Take 1 capsule by mouth daily       order for DME Equipment being ordered: thigh high compression stocking, 15-20 mmHg 1 Device 0     fluticasone (FLONASE) 50 MCG/ACT spray USE ONE TO TWO SPRAY(S) IN EACH NOSTRIL ONCE DAILY (Patient not taking: Reported on 6/19/2019) 16 g 3     Allergies   Allergen Reactions     Seasonal Allergies      Penicillins Rash     Sulfa Drugs Rash       ROS:  Constitutional, HEENT, cardiovascular, pulmonary, gi and gu systems are negative, except as otherwise noted.    OBJECTIVE:                                                    /62 (BP Location: Right arm, Patient Position: Sitting, Cuff Size: Adult Regular)   Pulse 65   Temp 97.5  F (36.4  C) (Tympanic)   Resp 14   Ht 1.638 m (5' 4.5\")   Wt 53.5 kg (118 lb)   SpO2 97%   BMI 19.94 kg/m    Body mass index is 19.94 kg/m .  GENERAL APPEARANCE: healthy, alert and no distress  EYES: Eyes grossly normal to inspection, PERRL and conjunctivae and sclerae normal  HENT: nose and mouth without ulcers or lesions and cerumen in right ear canal  NECK: no adenopathy  RESP: lungs clear to auscultation - no rales, rhonchi or wheezes  CV: regular rates and rhythm, normal S1 S2, no S3 or S4 and no murmur, click or rub  PSYCH: mentation appears normal and affect normal/bright         ASSESSMENT/PLAN:                                                    1. Acute sinusitis with symptoms > 10 days  Medication changed to cefprozil.  Symptomatic cares discussed.  Follow-up if no improvement noted.  - cefPROZIL (CEFZIL) 500 MG tablet; Take 1 tablet (500 mg) by mouth 2 times daily for 10 days  " Dispense: 20 tablet; Refill: 0    2. Ceruminosis, right  ENT referral ordered.  - OTOLARYNGOLOGY REFERRAL    Over 25 minutes are spent with the patient, over 15 minutes of which was in education and counseling regarding current conditions and treatment/therapy options/risks/benefits/etc, including review of Urgent Care recommendations, medication/antibiotic discussion, discussion of proper ear cleaning, and future planning.      Delaney Miller MD  [facility]

## 2019-06-19 NOTE — NURSING NOTE
"Chief Complaint   Patient presents with     ER F/U       Initial /62 (BP Location: Right arm, Patient Position: Sitting, Cuff Size: Adult Regular)   Pulse 65   Temp 97.5  F (36.4  C) (Tympanic)   Resp 14   Ht 1.638 m (5' 4.5\")   Wt 53.5 kg (118 lb)   SpO2 97%   BMI 19.94 kg/m   Estimated body mass index is 19.94 kg/m  as calculated from the following:    Height as of this encounter: 1.638 m (5' 4.5\").    Weight as of this encounter: 53.5 kg (118 lb).  Medication Reconciliation: complete   Kavitha Ricks          "

## 2019-06-25 ENCOUNTER — TELEPHONE (OUTPATIENT)
Dept: FAMILY MEDICINE | Facility: OTHER | Age: 69
End: 2019-06-25

## 2019-06-25 DIAGNOSIS — J01.90 ACUTE SINUSITIS WITH SYMPTOMS > 10 DAYS: ICD-10-CM

## 2019-06-25 RX ORDER — FLUTICASONE PROPIONATE 50 MCG
SPRAY, SUSPENSION (ML) NASAL
Qty: 16 G | Refills: 3 | Status: SHIPPED | OUTPATIENT
Start: 2019-06-25 | End: 2020-06-22

## 2019-06-25 NOTE — TELEPHONE ENCOUNTER
Pt called, reports that she was seen last Wednesday for sinusitis and started on cefzil. Pt reports no improvement since starting abx. No fever. Continues to c/o sinus pain and pressure. Pt is wondering if she should continue abx or not. Please advise. Thank you!

## 2019-06-25 NOTE — TELEPHONE ENCOUNTER
Patient called back, this writer will send in another script for Flonase to pharmacy of choice.  Advised patient to encourage fluids and continue using her OTC pain reliever for headache.  Patient verbalized understanding of continuing her antibiotic.  She will resume her Flonase today.

## 2019-06-25 NOTE — TELEPHONE ENCOUNTER
I would continue antibiotics and I would recommend she restart allergy nasal spray (Flonase) if she hasn't already

## 2019-07-01 ENCOUNTER — OFFICE VISIT (OUTPATIENT)
Dept: FAMILY MEDICINE | Facility: OTHER | Age: 69
End: 2019-07-01
Attending: FAMILY MEDICINE
Payer: COMMERCIAL

## 2019-07-01 VITALS
HEIGHT: 65 IN | WEIGHT: 118 LBS | DIASTOLIC BLOOD PRESSURE: 70 MMHG | SYSTOLIC BLOOD PRESSURE: 126 MMHG | RESPIRATION RATE: 16 BRPM | OXYGEN SATURATION: 98 % | TEMPERATURE: 96.3 F | HEART RATE: 64 BPM | BODY MASS INDEX: 19.66 KG/M2

## 2019-07-01 DIAGNOSIS — R51.9 LEFT TEMPORAL HEADACHE: Primary | ICD-10-CM

## 2019-07-01 LAB — ERYTHROCYTE [SEDIMENTATION RATE] IN BLOOD BY WESTERGREN METHOD: 13 MM/H (ref 0–30)

## 2019-07-01 PROCEDURE — 86140 C-REACTIVE PROTEIN: CPT | Mod: ZL | Performed by: FAMILY MEDICINE

## 2019-07-01 PROCEDURE — 85652 RBC SED RATE AUTOMATED: CPT | Mod: ZL | Performed by: FAMILY MEDICINE

## 2019-07-01 PROCEDURE — 36415 COLL VENOUS BLD VENIPUNCTURE: CPT | Mod: ZL | Performed by: FAMILY MEDICINE

## 2019-07-01 PROCEDURE — 99214 OFFICE O/P EST MOD 30 MIN: CPT | Performed by: FAMILY MEDICINE

## 2019-07-01 PROCEDURE — G0463 HOSPITAL OUTPT CLINIC VISIT: HCPCS

## 2019-07-01 ASSESSMENT — PAIN SCALES - GENERAL: PAINLEVEL: NO PAIN (0)

## 2019-07-01 ASSESSMENT — MIFFLIN-ST. JEOR: SCORE: 1058.18

## 2019-07-01 NOTE — PROGRESS NOTES
SUBJECTIVE:   Dena Cedeno is a 68 year old female who presents to clinic today for the following   health issues:      RESPIRATORY SYMPTOMS      Duration: 1 month    Description  nasal congestion, facial pain/pressure, headache and fatigue/malaise    Severity: moderate    Accompanying signs and symptoms: pt  had a stroke recently    History (predisposing factors):  none    Precipitating or alleviating factors: None    Therapies tried and outcome:  nasal spray/wash - antibiotic, still taking tylenol extra strength    Patient states symptoms really haven't improved all that much, she notes her headaches is centered in the left temple area        Additional history: as documented    Reviewed  and updated as needed this visit by clinical staff  Tobacco  Allergies  Meds  Med Hx  Surg Hx  Fam Hx  Soc Hx        Reviewed and updated as needed this visit by Provider         Patient Active Problem List   Diagnosis     ACP (advance care planning)     Osteopenia     Anxiety     Insomnia     Past Surgical History:   Procedure Laterality Date     APPENDECTOMY  2012     COLONOSCOPY  10/18/13    8 year follow-up recommended     COL[facility]OSCOPY  2018    [facility]     GYN SURGERY       D AND C     GYN SURGERY      TUBAL LIGATION       Social History     Tobacco Use     Smoking status: Former Smoker     Last attempt to quit: 1975     Years since quittin.5     Smokeless tobacco: Never Used     Tobacco comment: quit smoking in her 20s, about 1 pack-year history total   Substance Use Topics     Alcohol use: Yes     Comment: social     Family History   Problem Relation Age of Onset     Osteoporosis Mother      Colon Cancer Mother 58     Cerebrovascular Disease Father      Alzheimer Disease Father      Hypertension Father      Thyroid Cancer Daughter      Dementia Sister         FRONTAL LOBE     Colon Cancer Other         grandfather     Colon Cancer Other         aunt         Current Outpatient  "Medications   Medication Sig Dispense Refill     Calcium Carbonate (CALCIUM 600 PO) Take 2 tablets by mouth daily        cetirizine (ZYRTEC) 10 MG tablet Take 10 mg by mouth daily as needed        fluticasone (FLONASE) 50 MCG/ACT nasal spray USE ONE TO TWO SPRAY(S) IN EACH NOSTRIL ONCE DAILY 16 g 3     LORazepam (ATIVAN) 1 MG tablet TAKE 1 TABLET BY MOUTH ONCE DAILY AS NEEDED 90 tablet 1     magnesium 250 MG tablet Take 1 tablet by mouth 2 times daily        Multiple Vitamins-Minerals (PRESERVISION AREDS PO) Take 2 capsules by mouth daily       Omega-3 Fatty Acids (FISH OIL) 1200 MG CAPS Take 1 capsule by mouth daily       order for DME Equipment being ordered: thigh high compression stocking, 15-20 mmHg 1 Device 0     Allergies   Allergen Reactions     Seasonal Allergies      Penicillins Rash     Sulfa Drugs Rash       ROS:  Constitutional, HEENT, cardiovascular, pulmonary, gi and gu systems are negative, except as otherwise noted.    OBJECTIVE:                                                    /70 (BP Location: Right arm, Patient Position: Sitting, Cuff Size: Adult Regular)   Pulse 64   Temp 96.3  F (35.7  C) (Tympanic)   Resp 16   Ht 1.638 m (5' 4.5\")   Wt 53.5 kg (118 lb)   SpO2 98%   BMI 19.94 kg/m    Body mass index is 19.94 kg/m .  GENERAL APPEARANCE: healthy, alert and no distress  EYES: Eyes grossly normal to inspection, PERRL and conjunctivae and sclerae normal  HENT: ear canals and TM's normal and nose and mouth without ulcers or lesions  NECK: no adenopathy  RESP: lungs clear to auscultation - no rales, rhonchi or wheezes  CV: regular rates and rhythm, normal S1 S2, no S3 or S4 and no murmur, click or rub  NEURO: Normal strength and tone, mentation intact, speech normal and patient notes area of headache over left temple region  PSYCH: mentation appears normal and affect normal/bright         ASSESSMENT/PLAN:                                                    1. Left temporal headache  Labs " and CT ordered.  Follow-up with results when available.  - Erythrocyte sedimentation rate auto  - CRP, inflammation  - CT Head w/o Contrast; Future      Over 25 minutes are spent with the patient, over 15 minutes of which was in education and counseling regarding current conditions and treatment/therapy options/risks/benefits/etc, including current symptoms, treatments, differential diagnosis, and possibility of temporal arteritis.      Delaney Miller MD  [facility]

## 2019-07-01 NOTE — NURSING NOTE
"Chief Complaint   Patient presents with     URI       Initial /70 (BP Location: Right arm, Patient Position: Sitting, Cuff Size: Adult Regular)   Pulse 64   Temp 96.3  F (35.7  C) (Tympanic)   Resp 16   Ht 1.638 m (5' 4.5\")   Wt 53.5 kg (118 lb)   SpO2 98%   BMI 19.94 kg/m   Estimated body mass index is 19.94 kg/m  as calculated from the following:    Height as of this encounter: 1.638 m (5' 4.5\").    Weight as of this encounter: 53.5 kg (118 lb).  Medication Reconciliation: complete  "

## 2019-07-02 LAB — CRP SERPL-MCNC: <2.9 MG/L (ref 0–8)

## 2019-07-12 ENCOUNTER — OFFICE VISIT (OUTPATIENT)
Dept: OTOLARYNGOLOGY | Facility: OTHER | Age: 69
End: 2019-07-12
Attending: FAMILY MEDICINE
Payer: COMMERCIAL

## 2019-07-12 VITALS
DIASTOLIC BLOOD PRESSURE: 74 MMHG | HEIGHT: 65 IN | TEMPERATURE: 97.4 F | WEIGHT: 118 LBS | SYSTOLIC BLOOD PRESSURE: 122 MMHG | BODY MASS INDEX: 19.66 KG/M2

## 2019-07-12 DIAGNOSIS — J30.89 PERENNIAL ALLERGIC RHINITIS WITH SEASONAL VARIATION: Primary | ICD-10-CM

## 2019-07-12 DIAGNOSIS — J34.3 NASAL TURBINATE HYPERTROPHY: ICD-10-CM

## 2019-07-12 DIAGNOSIS — H93.8X3 PLUGGED FEELING IN EAR, BILATERAL: ICD-10-CM

## 2019-07-12 DIAGNOSIS — H61.21 CERUMINOSIS, RIGHT: ICD-10-CM

## 2019-07-12 DIAGNOSIS — J30.2 PERENNIAL ALLERGIC RHINITIS WITH SEASONAL VARIATION: Primary | ICD-10-CM

## 2019-07-12 DIAGNOSIS — R09.82 PND (POST-NASAL DRIP): ICD-10-CM

## 2019-07-12 DIAGNOSIS — H69.93 ETD (EUSTACHIAN TUBE DYSFUNCTION), BILATERAL: ICD-10-CM

## 2019-07-12 DIAGNOSIS — J34.2 DNS (DEVIATED NASAL SEPTUM): ICD-10-CM

## 2019-07-12 PROCEDURE — 69210 REMOVE IMPACTED EAR WAX UNI: CPT | Performed by: NURSE PRACTITIONER

## 2019-07-12 PROCEDURE — 92504 EAR MICROSCOPY EXAMINATION: CPT | Performed by: NURSE PRACTITIONER

## 2019-07-12 PROCEDURE — 99213 OFFICE O/P EST LOW 20 MIN: CPT | Mod: 25 | Performed by: NURSE PRACTITIONER

## 2019-07-12 PROCEDURE — G0463 HOSPITAL OUTPT CLINIC VISIT: HCPCS

## 2019-07-12 RX ORDER — FLUNISOLIDE 0.25 MG/ML
2 SOLUTION NASAL EVERY 12 HOURS
Qty: 1 BOTTLE | Refills: 11 | Status: SHIPPED | OUTPATIENT
Start: 2019-07-12 | End: 2020-05-12

## 2019-07-12 ASSESSMENT — PAIN SCALES - GENERAL: PAINLEVEL: NO PAIN (0)

## 2019-07-12 ASSESSMENT — MIFFLIN-ST. JEOR: SCORE: 1058.18

## 2019-07-12 NOTE — PROGRESS NOTES
Otolaryngology Note         Chief Complaint:     Chief Complaint   Patient presents with     Cerumen Impaction     bilateral          History of Present Illness:     Dena Cedeno is a 68 year old female seen today for cerumen debridement and concerns regarding plugged ears.    She has had bilateral ear plugging for over year, L>R.    Seasonal allergies, uses Flonase and Claritin, along with PRN Neti Pot. Mild relief, ongoing mild PND.  Symptoms: PND, nasal congestion, sneezing  Exacerbating factors: Year round, dust. Spring is the worse.   No history of allergy testing.   No chronic sinusitis.     Mild hearing loss has been gradual over the years with no acute changes. No fluctuating hearing loss.  No otalgia, otorrhea or aura fullness.  Vertigo: denies  Tinnitus: denies  There is no facial weakness, facial numbness or dysphagia.  No COM, otologic surgeries or trauma.  Family history of adult onset of hearing loss (mom with unilateal hearing loss, unknown reason)    No audiogram for review.         Medications:     Current Outpatient Rx   Medication Sig Dispense Refill     Calcium Carbonate (CALCIUM 600 PO) Take 2 tablets by mouth daily        cetirizine (ZYRTEC) 10 MG tablet Take 10 mg by mouth daily as needed        fluticasone (FLONASE) 50 MCG/ACT nasal spray USE ONE TO TWO SPRAY(S) IN EACH NOSTRIL ONCE DAILY 16 g 3     LORazepam (ATIVAN) 1 MG tablet TAKE 1 TABLET BY MOUTH ONCE DAILY AS NEEDED 90 tablet 1     magnesium 250 MG tablet Take 1 tablet by mouth 2 times daily        Multiple Vitamins-Minerals (PRESERVISION AREDS PO) Take 2 capsules by mouth daily       Omega-3 Fatty Acids (FISH OIL) 1200 MG CAPS Take 1 capsule by mouth daily       order for DME Equipment being ordered: thigh high compression stocking, 15-20 mmHg 1 Device 0            Allergies:     Allergies: Seasonal allergies; Penicillins; and Sulfa drugs          Past Medical History:     Past Medical History:   Diagnosis Date     Actinic  "keratosis      Anxiety      Insomnia      Osteopenia      Vaginal atrophy      Varicose veins of both lower extremities             Past Surgical History:     Past Surgical History:   Procedure Laterality Date     APPENDECTOMY  2012     COLONOSCOPY  10/18/13    8 year follow-up recommended     [facility]OSCOPY  2018    [facility]     GYN SURGERY       D AND C     GYN SURGERY      TUBAL LIGATION       ENT family history reviewed         Social History:     Social History     Tobacco Use     Smoking status: Former Smoker     Last attempt to quit: 1975     Years since quittin.5     Smokeless tobacco: Never Used     Tobacco comment: quit smoking in her 20s, about 1 pack-year history total   Substance Use Topics     Alcohol use: Yes     Comment: social     Drug use: No            Review of Systems:     ROS: See HPI         Physical Exam:     /74   Temp 97.4  F (36.3  C) (Tympanic)   Ht 1.638 m (5' 4.5\")   Wt 53.5 kg (118 lb)   BMI 19.94 kg/m      General - The patient is well nourished and well developed, and appears to have good nutritional status.  Alert and oriented to person and place, answers questions and cooperates with examination appropriately.   Head and Face - Normocephalic and atraumatic, with no gross asymmetry noted.  The facial nerve is intact, with strong symmetric movements.  Voice and Breathing - The patient was breathing comfortably without the use of accessory muscles. There was no wheezing, stridor. The patients voice was clear and strong, and had appropriate pitch and quality.  Ears - External ear normal. Ears examined under microscope. Right EAC with cerumen, removed with cupped forceps, TM intact, no retraction/effusion. Good mobility of middle ear with valsalva. (no relief in plugged sensation). Left EAC patent, TM intact with no retraction/effusion, poor aeration of middle ear with valsalva.   Eyes - Extraocular movements intact, and the pupils were reactive to light. " Sclera were not icteric or injected, conjunctiva were pink and moist.  Mouth - Examination of the oral cavity showed pink, healthy oral mucosa. Dentition in good condition. No lesions or ulcerations noted. The tongue was mobile and midline.   Throat - The walls of the oropharynx were smooth, pink, moist, symmetric, and had no lesions or ulcerations.  The tonsillar pillars and soft palate were symmetric. The uvula was midline on elevation.    Neck - Normal midline excursion of the laryngotracheal complex during swallowing.  Full range of motion on passive movement.  Palpation of the occipital, submental, submandibular, internal jugular chain, and supraclavicular nodes did not demonstrate any abnormal lymph nodes or masses.  Palpation of the thyroid was soft and smooth, with no nodules or goiter appreciated.  The trachea was mobile and midline.  Nose - External contour is symmetric, no gross deflection or scars.  Nasal mucosa is pink and moist with no abnormal mucus.  The septum and turbinates were evaluated: left caudal deviation, right turbinate hypertrophy. No polyps, masses, or purulence noted on examination.         Assessment and Plan:       ICD-10-CM    1. Perennial allergic rhinitis with seasonal variation J30.89 flunisolide (NASALIDE) 25 MCG/ACT (0.025%) SOLN spray    J30.2    2. Ceruminosis, right H61.21    3. ETD (Eustachian tube dysfunction), bilateral H69.83 AUDIOLOGY ADULT REFERRAL   4. Plugged feeling in ear, bilateral H93.8X3    5. PND (post-nasal drip) R09.82    6. Nasal turbinate hypertrophy J34.3    7. DNS (deviated nasal septum) J34.2      > 1 year bilateral ear plugging L>R.  Normal appearing TM's with poor aeration of left middle ear space with valsalva.  Allergies not 100% controlled, ongoing mild PND.    I suspect allergies causing inflammation/PND are resulting in ETD sx.    Education on eustachian tube dysfunction was provided.  This can occur in patients after an upper respiratory infection,  as a complication of allergies, after altitude changes, as a sequelae of nasal anatomic abnormalities or as part of an genetic tendency.  Eustachian tube exercises were demonstrated and can be helpful.  A brochure on ears and altitude was given and reviewed as well.  Medical management can be in the form of decongestants used temporarily and with an understanding of side effects to blood pressure and prostate problems.  Nasal steroids can be of benefit and may take a few week to obtain maximum benefit.  Antihistamines are another option.    If problems persist radiographic work-up for sinus problems can include a CT scan of the paranasal sinuses looking for anatomic abnormalities. A complete allergy work-up may also be obtained.    Continue Zyrtec, take at night time so less drowsy.  Stop Flonase, start Flunisolide.  Change Neti Pot to Andrae Med sinus irrigation BID/PRN.  Allergen avoidance.    Follow-up in 6-8 weeks, audiogram first for re-evaluation.  If ongoing symptoms, evaluate eustachian tubes with nasal endoscopy.    May refer to Dr. Lowe if ongoing symptoms to see if endoscopic dilation of eustachian tubes would be beneficial dependent on symptoms.     Encouraged her to follow-up sooner as needed.     Yamilex Bates NP  ENT  [facility], [place]  [phone]

## 2019-07-12 NOTE — NURSING NOTE
"Chief Complaint   Patient presents with     Cerumen Impaction     bilateral       Initial /74   Temp 97.4  F (36.3  C) (Tympanic)   Ht 1.638 m (5' 4.5\")   Wt 53.5 kg (118 lb)   BMI 19.94 kg/m   Estimated body mass index is 19.94 kg/m  as calculated from the following:    Height as of this encounter: 1.638 m (5' 4.5\").    Weight as of this encounter: 53.5 kg (118 lb).  Medication Reconciliation: complete  "

## 2019-07-12 NOTE — LETTER
7/12/2019         RE: Dena Cedeno  [address] [facility] [address]        Dear Colleague,    Thank you for referring your patient, Dena Cedeno, to the [facility]. Please see a copy of my visit note below.    Otolaryngology Note         Chief Complaint:     Chief Complaint   Patient presents with     Cerumen Impaction     bilateral          History of Present Illness:     Dena Cedeno is a 68 year old female seen today for cerumen debridement and concerns regarding plugged ears.    She has had bilateral ear plugging for over year, L>R.    Seasonal allergies, uses Flonase and Claritin, along with PRN Neti Pot. Mild relief, ongoing mild PND.  Symptoms: PND, nasal congestion, sneezing  Exacerbating factors: Year round, dust. Spring is the worse.   No history of allergy testing.   No chronic sinusitis.     Mild hearing loss has been gradual over the years with no acute changes. No fluctuating hearing loss.  No otalgia, otorrhea or aura fullness.  Vertigo: denies  Tinnitus: denies  There is no facial weakness, facial numbness or dysphagia.  No COM, otologic surgeries or trauma.  Family history of adult onset of hearing loss (mom with unilateal hearing loss, unknown reason)    No audiogram for review.         Medications:     Current Outpatient Rx   Medication Sig Dispense Refill     Calcium Carbonate (CALCIUM 600 PO) Take 2 tablets by mouth daily        cetirizine (ZYRTEC) 10 MG tablet Take 10 mg by mouth daily as needed        fluticasone (FLONASE) 50 MCG/ACT nasal spray USE ONE TO TWO SPRAY(S) IN EACH NOSTRIL ONCE DAILY 16 g 3     LORazepam (ATIVAN) 1 MG tablet TAKE 1 TABLET BY MOUTH ONCE DAILY AS NEEDED 90 tablet 1     magnesium 250 MG tablet Take 1 tablet by mouth 2 times daily        Multiple Vitamins-Minerals (PRESERVISION AREDS PO) Take 2 capsules by mouth daily       Omega-3 Fatty Acids (FISH OIL) 1200 MG CAPS Take 1 capsule by mouth daily       order for DME Equipment  "being ordered: thigh high compression stocking, 15-20 mmHg 1 Device 0            Allergies:     Allergies: Seasonal allergies; Penicillins; and Sulfa drugs          Past Medical History:     Past Medical History:   Diagnosis Date     Actinic keratosis      Anxiety      Insomnia      Osteopenia      Vaginal atrophy      Varicose veins of both lower extremities             Past Surgical History:     Past Surgical History:   Procedure Laterality Date     APPENDECTOMY       COLONOSCOPY  10/18/13    8 year follow-up recommended     COL[facility]OSCOPY  2018    [facility]     GYN SURGERY       D AND C     GYN SURGERY      TUBAL LIGATION       ENT family history reviewed         Social History:     Social History     Tobacco Use     Smoking status: Former Smoker     Last attempt to quit: 1975     Years since quittin.5     Smokeless tobacco: Never Used     Tobacco comment: quit smoking in her 20s, about 1 pack-year history total   Substance Use Topics     Alcohol use: Yes     Comment: social     Drug use: No            Review of Systems:     ROS: See HPI         Physical Exam:     /74   Temp 97.4  F (36.3  C) (Tympanic)   Ht 1.638 m (5' 4.5\")   Wt 53.5 kg (118 lb)   BMI 19.94 kg/m       General - The patient is well nourished and well developed, and appears to have good nutritional status.  Alert and oriented to person and place, answers questions and cooperates with examination appropriately.   Head and Face - Normocephalic and atraumatic, with no gross asymmetry noted.  The facial nerve is intact, with strong symmetric movements.  Voice and Breathing - The patient was breathing comfortably without the use of accessory muscles. There was no wheezing, stridor. The patients voice was clear and strong, and had appropriate pitch and quality.  Ears - External ear normal. Ears examined under microscope. Right EAC with cerumen, removed with cupped forceps, TM intact, no retraction/effusion. Good " mobility of middle ear with valsalva. (no relief in plugged sensation). Left EAC patent, TM intact with no retraction/effusion, poor aeration of middle ear with valsalva.   Eyes - Extraocular movements intact, and the pupils were reactive to light. Sclera were not icteric or injected, conjunctiva were pink and moist.  Mouth - Examination of the oral cavity showed pink, healthy oral mucosa. Dentition in good condition. No lesions or ulcerations noted. The tongue was mobile and midline.   Throat - The walls of the oropharynx were smooth, pink, moist, symmetric, and had no lesions or ulcerations.  The tonsillar pillars and soft palate were symmetric. The uvula was midline on elevation.    Neck - Normal midline excursion of the laryngotracheal complex during swallowing.  Full range of motion on passive movement.  Palpation of the occipital, submental, submandibular, internal jugular chain, and supraclavicular nodes did not demonstrate any abnormal lymph nodes or masses.  Palpation of the thyroid was soft and smooth, with no nodules or goiter appreciated.  The trachea was mobile and midline.  Nose - External contour is symmetric, no gross deflection or scars.  Nasal mucosa is pink and moist with no abnormal mucus.  The septum and turbinates were evaluated: left caudal deviation, right turbinate hypertrophy. No polyps, masses, or purulence noted on examination.         Assessment and Plan:       ICD-10-CM    1. Perennial allergic rhinitis with seasonal variation J30.89 flunisolide (NASALIDE) 25 MCG/ACT (0.025%) SOLN spray    J30.2    2. Ceruminosis, right H61.21    3. ETD (Eustachian tube dysfunction), bilateral H69.83 AUDIOLOGY ADULT REFERRAL   4. Plugged feeling in ear, bilateral H93.8X3    5. PND (post-nasal drip) R09.82    6. Nasal turbinate hypertrophy J34.3    7. DNS (deviated nasal septum) J34.2      > 1 year bilateral ear plugging L>R.  Normal appearing TM's with poor aeration of left middle ear space with  valsalva.  Allergies not 100% controlled, ongoing mild PND.    I suspect allergies causing inflammation/PND are resulting in ETD sx.    Education on eustachian tube dysfunction was provided.  This can occur in patients after an upper respiratory infection, as a complication of allergies, after altitude changes, as a sequelae of nasal anatomic abnormalities or as part of an genetic tendency.  Eustachian tube exercises were demonstrated and can be helpful.  A brochure on ears and altitude was given and reviewed as well.  Medical management can be in the form of decongestants used temporarily and with an understanding of side effects to blood pressure and prostate problems.  Nasal steroids can be of benefit and may take a few week to obtain maximum benefit.  Antihistamines are another option.    If problems persist radiographic work-up for sinus problems can include a CT scan of the paranasal sinuses looking for anatomic abnormalities. A complete allergy work-up may also be obtained.    Continue Zyrtec, take at night time so less drowsy.  Stop Flonase, start Flunisolide.  Change Neti Pot to Andrae Med sinus irrigation BID/PRN.  Allergen avoidance.    Follow-up in 6-8 weeks, audiogram first for re-evaluation.  If ongoing symptoms, evaluate eustachian tubes with nasal endoscopy.    May refer to Dr. Lowe if ongoing symptoms to see if endoscopic dilation of eustachian tubes would be beneficial dependent on symptoms.     Encouraged her to follow-up sooner as needed.     Yamilex Bates NP  ENT  [facility], [place]  [phone]      Again, thank you for allowing me to participate in the care of your patient.        Sincerely,        JOSE Elliott CNP

## 2019-07-12 NOTE — PATIENT INSTRUCTIONS
Thank you for allowing Yamilex Bates CNP and our ENT team to participate in your care.  If your medications are too expensive, please give the nurse a call.  We can possibly change this medication.  If you have a scheduling or an appointment question please contact [facility] Unit Coordinator at their direct line [phone]  ALL nursing questions or concerns can be directed to your ENT nurse at: [phone] - Anais    Stop Flonase and start flunisolide.  Continue Zyrtec.  Pop ears 4-6 times daily.    Andrae Med Saline irrigation bottle.  Fill bottle up to line with warm distilled water and then add 1 packet of the salt solution that comes with the bottle (2 packets if you were instructed to use hypertonic saline). Dissolve in bottle up to 240 ml johnny.  Irrigate each side of your nose leaning over the sink, using 1/2 the volume of the bottle in each nostril every irrigation.Irrigate 2-3 times daily and as needed.  After irrigation, blow nose gently, then apply any other nasal medication that you may have been prescribed today (nasal steroids or nasal antihistamines)     Follow-up in 6-8 weeks for audiogram and follow-up with me.    Return sooner as needed.

## 2019-07-15 ENCOUNTER — HOSPITAL ENCOUNTER (OUTPATIENT)
Dept: CT IMAGING | Facility: HOSPITAL | Age: 69
Discharge: HOME | End: 2019-07-15
Attending: FAMILY MEDICINE | Admitting: FAMILY MEDICINE
Payer: MEDICARE

## 2019-07-15 DIAGNOSIS — R51.9 LEFT TEMPORAL HEADACHE: ICD-10-CM

## 2019-07-15 PROCEDURE — 70450 CT HEAD/BRAIN W/O DYE: CPT | Mod: TC

## 2019-09-03 DIAGNOSIS — F41.9 ANXIETY: ICD-10-CM

## 2019-09-04 RX ORDER — LORAZEPAM 1 MG/1
TABLET ORAL
Qty: 30 TABLET | Refills: 5 | Status: SHIPPED | OUTPATIENT
Start: 2019-09-04 | End: 2020-02-26

## 2019-09-04 NOTE — TELEPHONE ENCOUNTER
LORazepam (ATIVAN) 1 MG tablet      Last Written Prescription Date:  2-5-19  Last Fill Quantity: 90,   # refills: 1  Last Office Visit: 7-1-19  Future Office visit:       Routing refill request to provider for review/approval because:  Drug not on the [facility], [facility] or [facility] refill protocol or controlled substance

## 2019-09-05 ENCOUNTER — OFFICE VISIT (OUTPATIENT)
Dept: AUDIOLOGY | Facility: OTHER | Age: 69
End: 2019-09-05
Attending: NURSE PRACTITIONER
Payer: MEDICARE

## 2019-09-05 DIAGNOSIS — H90.3 SENSORINEURAL HEARING LOSS (SNHL) OF BOTH EARS: Primary | ICD-10-CM

## 2019-09-05 PROCEDURE — 92550 TYMPANOMETRY & REFLEX THRESH: CPT | Performed by: AUDIOLOGIST

## 2019-09-05 PROCEDURE — 92557 COMPREHENSIVE HEARING TEST: CPT | Performed by: AUDIOLOGIST

## 2019-09-05 NOTE — PROGRESS NOTES
Audiology Evaluation Completed. Please refer SCANNED AUDIOGRAM and/or TYMPANOGRAM for BACKGROUND, RESULTS, RECOMMENDATIONS.      Zora CALVERT, [facility]  Audiologist #[id number]

## 2019-09-11 NOTE — PROGRESS NOTES
"Otolaryngology Progress Note           Chief Complaint:     Chief Complaint   Patient presents with     Follow Up     SNHL, ETD, R cerumen          History of Present Illness:     Dena Cedeno is a 68 year old female here to follow-up on her allergies, bilateral ETD, and nasal symptoms. I last saw her 7/12/19 and had her stop Flonase and start Flunisolide, continue zyrtec, change Neti Pot to Andrae Med sinus bottle irrigation. Discussed possible endoscopic dilation of eustachian tubes pending follow-up/audiogram and evaluation with Dr. Lowe.    Today Dena reports resolution in her headaches. She has been compliant with the Andrae Med Sinus irrigation at least BID, along with the daily Flunisolide. Occasionally, she will take an antihistamine, but not on a daily basis as she feels her nasal irrigations and nasal steroids are more effective. She will still get itchy eyes when she is outdoors.   She is not having any sinus pain/pressure.  She continues to have PND, unchanged. Does spit it out at times and notes it is clear.   Throat is not irritated by it.     As far as her ears, she has been doing valsalva and feels the left ear is starting to move now, however, feels the right ear continues to open up more.   Feels less ear plugging.  No changes in her hearing, denies fluctuating hearing loss.     Head CT reviewed 7/15/19: Sinuses clear. Slight caudal deviation left side.    Audiogram 9/5/19  Tympanograms type A bilaterally  Right ear thresholds mild HFSNHL, SRT 10 dBHL, %  Left ear thresholds mild HFSNL, SRT 10 dBHL, %  SRT = PTA         Review of Systems:     ROS: See HPI         Physical Exam:     /68   Ht 1.638 m (5' 4.5\")   Wt 53.5 kg (118 lb)   BMI 19.94 kg/m      General - The patient is well nourished and well developed, and appears to have good nutritional status.  Alert and oriented to person and place, interactive.  Head and Face - Normocephalic and atraumatic, with no " gross asymmetry noted of the contour of the facial features.  The facial nerve is intact, with strong symmetric movements.  Neck- No palpable lymphadenopathy or thyroid mass.  Trachea is midline.  Eyes - Extraocular movements intact.   Ears- External ears normal. The ears were examined under microscopy bilaterally.  The right and left external auditory canals are patent, the tympanic membranes are intact without effusion or worrisome retractions.  Normal bony landmarks are appreciated.   Valsalva does show good aeration of middle ear space, but felt the right ear open more than the left ear when attempting.   Nose - Nasal mucosa is pink and moist with no abnormal mucus.    Mouth - Examination of the oral cavity shows pink, healthy, moist mucosa. No lesions or ulceration noted. The tongue is mobile and midline.    Throat - The walls of the oropharynx were smooth, pink, moist, symmetric, and had no lesions or ulcerations.  The tonsillar pillars and soft palate were symmetric.  The uvula was midline on elevation.      To further evaluate the nasal cavity, I performed rigid nasal endoscopy.  I first sprayed the nasal cavity bilaterally with a mix of lidocaine and neosynephrine.  I used a 2.7mm,   30 degree rigid nasal endoscope for examination.    Left caudal deviation of the septum with normal appearing nasal mucosa.    Left side:    Inferior turbinate normal.  The left middle turbinate and middle meatus were clearly visualized and normal in appearance.  Going further back, the sphenoethmoid and frontal recess were normal in appearance.  The nasopharynx was unremarkable, and the eustachian tube opening on this side was unobstructed.  No abnormal secretions, purulence, or polyps were noted.    Right side:    Inferior turbinate normal.  The right middle turbinate and middle meatus were clearly visualized and normal in appearance.  Going further back, the sphenoethmoid and frontal recess was normal in appearance.  The  nasopharynx was unremarkable, and the eustachian tube opening on this side was unobstructed.  No abnormal secretions, purulence, or polyps were noted.         Assessment and Plan:       ICD-10-CM    1. Perennial allergic rhinitis with seasonal variation J30.89     J30.2    2. ETD (Eustachian tube dysfunction), bilateral H69.83    3. Plugged feeling in ear, bilateral H93.8X3    4. PND (post-nasal drip) R09.82    5. Nasal turbinate hypertrophy J34.3    6. DNS (deviated nasal septum) J34.2    7. Sensorineural hearing loss (SNHL) of both ears H90.3      Resolution of her headaches with ongoing PND and intermittent itchy eyes.  Ears improving.   She is happy with the improvement.    Head CT 7/15/19 shows no concerning sinus findings.    Recommended allergy testing, she defers at this time. She wants to see how some changes affect her symptoms.    She will continue with the Andrae med rinses BID and PRN, flunisolide.  Recommend trial of daily antihistamine to see if this helps the the PND/itchy eyes. Try for at least 3 weeks to see if effective.    She wishes to contact me if ongoing symptoms or worsening symptoms despite recommended treatment.   If that presents, recommend allergy testing.  Otherwise if she is doing well with allergy control, recommend to follow-up at least every 6-12 months for ongoing evaluation/medication management.    She agrees and is happy with plan of care.      Yamilex Bates NP  ENT  [facility], [place]  [phone]

## 2019-09-12 ENCOUNTER — OFFICE VISIT (OUTPATIENT)
Dept: OTOLARYNGOLOGY | Facility: OTHER | Age: 69
End: 2019-09-12
Attending: NURSE PRACTITIONER
Payer: MEDICARE

## 2019-09-12 VITALS
DIASTOLIC BLOOD PRESSURE: 68 MMHG | HEIGHT: 65 IN | BODY MASS INDEX: 19.66 KG/M2 | SYSTOLIC BLOOD PRESSURE: 104 MMHG | WEIGHT: 118 LBS

## 2019-09-12 DIAGNOSIS — R79.89 HIGH SERUM VITAMIN D: ICD-10-CM

## 2019-09-12 DIAGNOSIS — J30.2 PERENNIAL ALLERGIC RHINITIS WITH SEASONAL VARIATION: Primary | ICD-10-CM

## 2019-09-12 DIAGNOSIS — R09.82 PND (POST-NASAL DRIP): ICD-10-CM

## 2019-09-12 DIAGNOSIS — H93.8X3 PLUGGED FEELING IN EAR, BILATERAL: ICD-10-CM

## 2019-09-12 DIAGNOSIS — Z79.899 OTHER LONG TERM (CURRENT) DRUG THERAPY: ICD-10-CM

## 2019-09-12 DIAGNOSIS — H90.3 SENSORINEURAL HEARING LOSS (SNHL) OF BOTH EARS: ICD-10-CM

## 2019-09-12 DIAGNOSIS — H69.93 ETD (EUSTACHIAN TUBE DYSFUNCTION), BILATERAL: ICD-10-CM

## 2019-09-12 DIAGNOSIS — J30.89 PERENNIAL ALLERGIC RHINITIS WITH SEASONAL VARIATION: Primary | ICD-10-CM

## 2019-09-12 DIAGNOSIS — J34.2 DNS (DEVIATED NASAL SEPTUM): ICD-10-CM

## 2019-09-12 DIAGNOSIS — J34.3 NASAL TURBINATE HYPERTROPHY: ICD-10-CM

## 2019-09-12 PROCEDURE — 36415 COLL VENOUS BLD VENIPUNCTURE: CPT | Mod: ZL | Performed by: FAMILY MEDICINE

## 2019-09-12 PROCEDURE — G0463 HOSPITAL OUTPT CLINIC VISIT: HCPCS | Mod: 25

## 2019-09-12 PROCEDURE — 99214 OFFICE O/P EST MOD 30 MIN: CPT | Mod: 25 | Performed by: NURSE PRACTITIONER

## 2019-09-12 PROCEDURE — 31231 NASAL ENDOSCOPY DX: CPT | Performed by: NURSE PRACTITIONER

## 2019-09-12 PROCEDURE — 92504 EAR MICROSCOPY EXAMINATION: CPT | Performed by: NURSE PRACTITIONER

## 2019-09-12 PROCEDURE — 92504 EAR MICROSCOPY EXAMINATION: CPT

## 2019-09-12 PROCEDURE — 82306 VITAMIN D 25 HYDROXY: CPT | Mod: ZL | Performed by: FAMILY MEDICINE

## 2019-09-12 ASSESSMENT — PAIN SCALES - GENERAL: PAINLEVEL: NO PAIN (0)

## 2019-09-12 ASSESSMENT — MIFFLIN-ST. JEOR: SCORE: 1058.18

## 2019-09-12 NOTE — NURSING NOTE
"Chief Complaint   Patient presents with     Follow Up     JAYLON GRIMES R cerumen       Initial /68   Ht 1.638 m (5' 4.5\")   Wt 53.5 kg (118 lb)   BMI 19.94 kg/m   Estimated body mass index is 19.94 kg/m  as calculated from the following:    Height as of this encounter: 1.638 m (5' 4.5\").    Weight as of this encounter: 53.5 kg (118 lb).  Medication Reconciliation: complete  "

## 2019-09-12 NOTE — LETTER
"    9/12/2019         RE: Dena Cedeno  [address] [place] [facility] [address]        Dear Colleague,    Thank you for referring your patient, Dena Cedeno, to the [facility]. Please see a copy of my visit note below.    Otolaryngology Progress Note           Chief Complaint:     Chief Complaint   Patient presents with     Follow Up     SNHL, ETD, R cerumen          History of Present Illness:     Dena Cedeno is a 68 year old female here to follow-up on her allergies, bilateral ETD, and nasal symptoms. I last saw her 7/12/19 and had her stop Flonase and start Flunisolide, continue zyrtec, change Neti Pot to Andrae Med sinus bottle irrigation. Discussed possible endoscopic dilation of eustachian tubes pending follow-up/audiogram and evaluation with Dr. Lowe.    Today Dena reports resolution in her headaches. She has been compliant with the Andrae Med Sinus irrigation at least BID, along with the daily Flunisolide. Occasionally, she will take an antihistamine, but not on a daily basis as she feels her nasal irrigations and nasal steroids are more effective. She will still get itchy eyes when she is outdoors.   She is not having any sinus pain/pressure.  She continues to have PND, unchanged. Does spit it out at times and notes it is clear.   Throat is not irritated by it.     As far as her ears, she has been doing valsalva and feels the left ear is starting to move now, however, feels the right ear continues to open up more.   Feels less ear plugging.  No changes in her hearing, denies fluctuating hearing loss.     Head CT reviewed 7/15/19: Sinuses clear. Slight caudal deviation left side.    Audiogram 9/5/19  Tympanograms type A bilaterally  Right ear thresholds mild HFSNHL, SRT 10 dBHL, %  Left ear thresholds mild HFSNL, SRT 10 dBHL, %  SRT = PTA         Review of Systems:     ROS: See HPI         Physical Exam:     /68   Ht 1.638 m (5' 4.5\")   Wt 53.5 kg " (118 lb)   BMI 19.94 kg/m       General - The patient is well nourished and well developed, and appears to have good nutritional status.  Alert and oriented to person and place, interactive.  Head and Face - Normocephalic and atraumatic, with no gross asymmetry noted of the contour of the facial features.  The facial nerve is intact, with strong symmetric movements.  Neck- No palpable lymphadenopathy or thyroid mass.  Trachea is midline.  Eyes - Extraocular movements intact.   Ears- External ears normal. The ears were examined under microscopy bilaterally.  The right and left external auditory canals are patent, the tympanic membranes are intact without effusion or worrisome retractions.  Normal bony landmarks are appreciated.   Valsalva does show good aeration of middle ear space, but felt the right ear open more than the left ear when attempting.   Nose - Nasal mucosa is pink and moist with no abnormal mucus.    Mouth - Examination of the oral cavity shows pink, healthy, moist mucosa. No lesions or ulceration noted. The tongue is mobile and midline.    Throat - The walls of the oropharynx were smooth, pink, moist, symmetric, and had no lesions or ulcerations.  The tonsillar pillars and soft palate were symmetric.  The uvula was midline on elevation.      To further evaluate the nasal cavity, I performed rigid nasal endoscopy.  I first sprayed the nasal cavity bilaterally with a mix of lidocaine and neosynephrine.  I used a 2.7mm,   30 degree rigid nasal endoscope for examination.    Left caudal deviation of the septum with normal appearing nasal mucosa.    Left side:    Inferior turbinate normal.  The left middle turbinate and middle meatus were clearly visualized and normal in appearance.  Going further back, the sphenoethmoid and frontal recess were normal in appearance.  The nasopharynx was unremarkable, and the eustachian tube opening on this side was unobstructed.  No abnormal secretions, purulence, or  polyps were noted.    Right side:    Inferior turbinate normal.  The right middle turbinate and middle meatus were clearly visualized and normal in appearance.  Going further back, the sphenoethmoid and frontal recess was normal in appearance.  The nasopharynx was unremarkable, and the eustachian tube opening on this side was unobstructed.  No abnormal secretions, purulence, or polyps were noted.         Assessment and Plan:       ICD-10-CM    1. Perennial allergic rhinitis with seasonal variation J30.89     J30.2    2. ETD (Eustachian tube dysfunction), bilateral H69.83    3. Plugged feeling in ear, bilateral H93.8X3    4. PND (post-nasal drip) R09.82    5. Nasal turbinate hypertrophy J34.3    6. DNS (deviated nasal septum) J34.2    7. Sensorineural hearing loss (SNHL) of both ears H90.3      Resolution of her headaches with ongoing PND and intermittent itchy eyes.  Ears improving.   She is happy with the improvement.    Head CT 7/15/19 shows no concerning sinus findings.    Recommended allergy testing, she defers at this time. She wants to see how some changes affect her symptoms.    She will continue with the Andrae med rinses BID and PRN, flunisolide.  Recommend trial of daily antihistamine to see if this helps the the PND/itchy eyes. Try for at least 3 weeks to see if effective.    She wishes to contact me if ongoing symptoms or worsening symptoms despite recommended treatment.   If that presents, recommend allergy testing.  Otherwise if she is doing well with allergy control, recommend to follow-up at least every 6-12 months for ongoing evaluation/medication management.    She agrees and is happy with plan of care.      Yamilex Bates NP  ENT  [facility], [place]  [phone]              Again, thank you for allowing me to participate in the care of your patient.        Sincerely,        JOSE Elliott CNP

## 2019-09-12 NOTE — PATIENT INSTRUCTIONS
Thank you for allowing Yamilex Bates CNP and our ENT team to participate in your care.  If your medications are too expensive or if there are any questions or concerns with your medication, please give the nurse a call.  If you have a scheduling or an appointment question please contact our Ear/Nose/Throat/Allergy Health Unit Coordinator at their direct line [phone].   ALL nursing questions or concerns can be directed to your ENT nurse at: [phone]- Anais    Continue Andrae med sinus rinses at least twice daily, more as needed.  Continue Flunisolide daily as directed  Start daily antihistamine and try every night for 3-4 weeks to see if it helps with the post nasal drainage.    Return to ENT if ongoing symptoms or wanting allergy testing.

## 2019-09-16 LAB — DEPRECATED CALCIDIOL+CALCIFEROL SERPL-MC: 110 UG/L (ref 20–75)

## 2019-09-26 ENCOUNTER — TELEPHONE (OUTPATIENT)
Dept: OTOLARYNGOLOGY | Facility: OTHER | Age: 69
End: 2019-09-26

## 2019-09-26 NOTE — TELEPHONE ENCOUNTER
Went over instructions with patient for allergy skin testing.  Reviewed patients current medications and patient will avoid all contraindicated medications prior to MQT testing.  Patient verbalizes understanding.  Copy of allergy testing packet will be mailed to patient.  MQT allergy testing and follow-up are scheduled.    Patient will need Consent for Allergy Testing reviewed by JAYA Clemens, prior to the test on 11/13/19.  Patient verbalized understanding.    Yumiko Morales RN

## 2019-09-26 NOTE — TELEPHONE ENCOUNTER
Pt called and states that she is interested in scheduling MQT.  It looks like the risks were not discussed with the patient.  Per Chanda, she will go over the risks the day of her appointment.  Please discuss allergy testing with her.

## 2019-10-07 ENCOUNTER — TRANSFERRED RECORDS (OUTPATIENT)
Dept: HEALTH INFORMATION MANAGEMENT | Facility: CLINIC | Age: 69
End: 2019-10-07

## 2019-10-30 ENCOUNTER — TRANSFERRED RECORDS (OUTPATIENT)
Dept: HEALTH INFORMATION MANAGEMENT | Facility: HOSPITAL | Age: 69
End: 2019-10-30

## 2019-10-30 PROBLEM — Z86.0101 HX OF ADENOMATOUS COLONIC POLYPS: Status: ACTIVE | Noted: 2018-05-09

## 2019-10-30 PROBLEM — Z80.0 FAMILY HX OF COLON CANCER: Status: ACTIVE | Noted: 2018-05-09

## 2019-10-30 PROBLEM — S92.345D CLOSED NONDISPLACED FRACTURE OF FOURTH METATARSAL BONE OF LEFT FOOT WITH ROUTINE HEALING, SUBSEQUENT ENCOUNTER: Status: ACTIVE | Noted: 2017-03-31

## 2019-11-13 ENCOUNTER — OFFICE VISIT (OUTPATIENT)
Dept: OTOLARYNGOLOGY | Facility: OTHER | Age: 69
End: 2019-11-13
Attending: PHYSICIAN ASSISTANT
Payer: MEDICARE

## 2019-11-13 ENCOUNTER — OFFICE VISIT (OUTPATIENT)
Dept: ALLERGY | Facility: OTHER | Age: 69
End: 2019-11-13
Attending: PHYSICIAN ASSISTANT
Payer: COMMERCIAL

## 2019-11-13 VITALS
HEIGHT: 65 IN | WEIGHT: 119 LBS | DIASTOLIC BLOOD PRESSURE: 69 MMHG | BODY MASS INDEX: 19.83 KG/M2 | HEART RATE: 58 BPM | SYSTOLIC BLOOD PRESSURE: 106 MMHG | OXYGEN SATURATION: 96 % | TEMPERATURE: 97.1 F

## 2019-11-13 VITALS
HEIGHT: 64 IN | WEIGHT: 119 LBS | DIASTOLIC BLOOD PRESSURE: 69 MMHG | OXYGEN SATURATION: 96 % | HEART RATE: 58 BPM | SYSTOLIC BLOOD PRESSURE: 106 MMHG | TEMPERATURE: 97.1 F | BODY MASS INDEX: 20.32 KG/M2

## 2019-11-13 DIAGNOSIS — J30.89 PERENNIAL ALLERGIC RHINITIS: ICD-10-CM

## 2019-11-13 DIAGNOSIS — H69.93 DYSFUNCTION OF BOTH EUSTACHIAN TUBES: ICD-10-CM

## 2019-11-13 DIAGNOSIS — J30.2 SEASONAL ALLERGIC RHINITIS, UNSPECIFIED TRIGGER: Primary | ICD-10-CM

## 2019-11-13 PROCEDURE — 95024 IQ TESTS W/ALLERGENIC XTRCS: CPT | Mod: TC

## 2019-11-13 PROCEDURE — 99213 OFFICE O/P EST LOW 20 MIN: CPT | Mod: 25 | Performed by: PHYSICIAN ASSISTANT

## 2019-11-13 PROCEDURE — G0463 HOSPITAL OUTPT CLINIC VISIT: HCPCS | Mod: 25

## 2019-11-13 PROCEDURE — 95004 PERQ TESTS W/ALRGNC XTRCS: CPT | Mod: TC

## 2019-11-13 PROCEDURE — 92504 EAR MICROSCOPY EXAMINATION: CPT | Performed by: PHYSICIAN ASSISTANT

## 2019-11-13 PROCEDURE — 92504 EAR MICROSCOPY EXAMINATION: CPT

## 2019-11-13 RX ORDER — VIT C/E/ZN/COPPR/LUTEIN/ZEAXAN 60 MG-6 MG
1 CAPSULE ORAL DAILY
COMMUNITY

## 2019-11-13 ASSESSMENT — PAIN SCALES - GENERAL
PAINLEVEL: NO PAIN (0)
PAINLEVEL: NO PAIN (0)

## 2019-11-13 ASSESSMENT — MIFFLIN-ST. JEOR
SCORE: 1054.78
SCORE: 1062.72

## 2019-11-13 NOTE — PROGRESS NOTES
.Dena was seen for allergy skin testing. Patient was seen by this nurse in conjunction with ENT provider. All encounter details are documented in ENT Provider's appointment from this same date. Please see referenced encounter for this visits documentation.   Chelly Perez RN

## 2019-11-13 NOTE — PROGRESS NOTES
"   Chief Complaint   Patient presents with     Other     MQT allergy skin testing     Dena has been improving with her current plan.   She feels that her rinses, nasal sprays are working well.   Currently using Flonase PRN  Continue with Andrae Med rinses.       MQT  Dilution #6- None  Dilution #5- Dust and Grass  Dilution #2- Weeds, trees, mold, dog.     Past Medical History:   Diagnosis Date     Actinic keratosis      Anxiety      Insomnia      Osteopenia      Vaginal atrophy      Varicose veins of both lower extremities         Allergies   Allergen Reactions     Lactose      Intolerant     Seasonal Allergies      Penicillins Rash     Sulfa Drugs Rash     Current Outpatient Medications   Medication     Calcium Carbonate (CALCIUM 600 PO)     cetirizine (ZYRTEC) 10 MG tablet     flunisolide (NASALIDE) 25 MCG/ACT (0.025%) SOLN spray     fluticasone (FLONASE) 50 MCG/ACT nasal spray     LORazepam (ATIVAN) 1 MG tablet     magnesium 250 MG tablet     multivitamin  with lutein (OCUVITE WITH LTEIN) CAPS per capsule     Omega-3 Fatty Acids (FISH OIL) 1200 MG CAPS     order for DME     No current facility-administered medications for this visit.       ROS: 10 point ROS neg other than the symptoms noted above in the HPI.  /69 (BP Location: Right arm, Patient Position: Sitting, Cuff Size: Adult Regular)   Pulse 58   Temp 97.1  F (36.2  C) (Oral)   Ht 1.626 m (5' 4\")   Wt 54 kg (119 lb)   SpO2 96%   BMI 20.43 kg/m      General - The patient is well nourished and well developed, and appears to have good nutritional status.  Alert and oriented to person and place, interactive.  Head and Face - Normocephalic and atraumatic, with no gross asymmetry noted of the contour of the facial features.  The facial nerve is intact, with strong symmetric movements.  Neck- No palpable lymphadenopathy or thyroid mass.  Trachea is midline.  Eyes - Extraocular movements intact.   Ears- External ears normal. The ears were examined " under microscopy bilaterally.  The right and left external auditory canals are patent, the tympanic membranes are intact without effusion or worrisome retractions.  Normal bony landmarks are appreciated.   Valsalva does show good aeration of middle ear space, but felt the right ear open more than the left ear when attempting.   Nose - Nasal mucosa is pink and moist with no abnormal mucus.    Mouth - Examination of the oral cavity shows pink, healthy, moist mucosa. No lesions or ulceration noted. The tongue is mobile and midline.    Throat - The walls of the oropharynx were smooth, pink, moist, symmetric, and had no lesions or ulcerations.  The tonsillar pillars and soft palate were symmetric.  The uvula was midline on elevation.      Last nasal exam was normal.         ASSESSMENT:    ICD-10-CM    1. Seasonal allergic rhinitis, unspecified trigger J30.2    2. Perennial allergic rhinitis J30.89    3. Dysfunction of both eustachian tubes H69.83        Work on allergy avoidance.   Continue with Flonase 2 sprays to each nostril daily or as needed  Continue with Zyrtec daily or seasonally.     Consider Budesonide rinses PRN or Astelin in future.         Chanda Beck PA-C  ENT  [facility], [place]  [phone]

## 2019-11-13 NOTE — NURSING NOTE
Prior to testing, the patient's identity is verified using name and date of birth.  Dena presents for allergy skin testing.      The patient's symptoms have included itchy skin, burning eyes, headaches that have been bothersome over the last 10 years.  They have become increasingly worse, especially during the spring season.  She is also bad during gardening.    The patient lives in a single family home that is about 65 years old.  The home is located in the [facility].  It has a basement, but there are no suspected mold, water and/or moisture issues in the home.  It does have carpet, including in the bedroom.  There is fuel oil heat, and central air conditioning.       Dena has 2 inside cats that sleep with her.     The patient denies allergy testing in the past.    All of the patients medications are reviewed prior to testing.  All appropriate medications have been stopped.         Consent is signed by the patient and signature is verified.    MQT/ID test is performed per protocol.  The patient tolerated testing well.  Benadryl cooling gel is applied to all test sites, and the 2 children's chewable Claritin are administered to the patient; both per standing orders for post test itching.    All findings are recorded on the paper flow sheet. Results are reviewed with the patient.  She is given written information regarding allergy.      The patient will follow-up with JAYA Clemens for treatment plan.      Chelly Perez RN

## 2019-11-13 NOTE — LETTER
"    11/13/2019         RE: Dena Cedeno  [address] [place] [address]        Dear Colleague,    Thank you for referring your patient, Dena Cedeno, to the [facility] [place]. Please see a copy of my visit note below.       Chief Complaint   Patient presents with     Other     MQT allergy skin testing     Dena has been improving with her current plan.   She feels that her rinses, nasal sprays are working well.   Currently using Flonase PRN  Continue with Andrae Med rinses.       MQT  Dilution #6- None  Dilution #5- Dust and Grass  Dilution #2- Weeds, trees, mold, dog.     Past Medical History:   Diagnosis Date     Actinic keratosis      Anxiety      Insomnia      Osteopenia      Vaginal atrophy      Varicose veins of both lower extremities         Allergies   Allergen Reactions     Lactose      Intolerant     Seasonal Allergies      Penicillins Rash     Sulfa Drugs Rash     Current Outpatient Medications   Medication     Calcium Carbonate (CALCIUM 600 PO)     cetirizine (ZYRTEC) 10 MG tablet     flunisolide (NASALIDE) 25 MCG/ACT (0.025%) SOLN spray     fluticasone (FLONASE) 50 MCG/ACT nasal spray     LORazepam (ATIVAN) 1 MG tablet     magnesium 250 MG tablet     multivitamin  with lutein (OCUVITE WITH LTEIN) CAPS per capsule     Omega-3 Fatty Acids (FISH OIL) 1200 MG CAPS     order for DME     No current facility-administered medications for this visit.       ROS: 10 point ROS neg other than the symptoms noted above in the HPI.  /69 (BP Location: Right arm, Patient Position: Sitting, Cuff Size: Adult Regular)   Pulse 58   Temp 97.1  F (36.2  C) (Oral)   Ht 1.626 m (5' 4\")   Wt 54 kg (119 lb)   SpO2 96%   BMI 20.43 kg/m       General - The patient is well nourished and well developed, and appears to have good nutritional status.  Alert and oriented to person and place, interactive.  Head and Face - Normocephalic and atraumatic, with no gross asymmetry noted of the contour of " the facial features.  The facial nerve is intact, with strong symmetric movements.  Neck- No palpable lymphadenopathy or thyroid mass.  Trachea is midline.  Eyes - Extraocular movements intact.   Ears- External ears normal. The ears were examined under microscopy bilaterally.  The right and left external auditory canals are patent, the tympanic membranes are intact without effusion or worrisome retractions.  Normal bony landmarks are appreciated.   Valsalva does show good aeration of middle ear space, but felt the right ear open more than the left ear when attempting.   Nose - Nasal mucosa is pink and moist with no abnormal mucus.    Mouth - Examination of the oral cavity shows pink, healthy, moist mucosa. No lesions or ulceration noted. The tongue is mobile and midline.    Throat - The walls of the oropharynx were smooth, pink, moist, symmetric, and had no lesions or ulcerations.  The tonsillar pillars and soft palate were symmetric.  The uvula was midline on elevation.      Last nasal exam was normal.         ASSESSMENT:    ICD-10-CM    1. Seasonal allergic rhinitis, unspecified trigger J30.2    2. Perennial allergic rhinitis J30.89    3. Dysfunction of both eustachian tubes H69.83        Work on allergy avoidance.   Continue with Flonase 2 sprays to each nostril daily or as needed  Continue with Zyrtec daily or seasonally.     Consider Budesonide rinses PRN or Astelin in future.         Chanda Beck PA-C  ENT  [facility], [place]  [phone]      Again, thank you for allowing me to participate in the care of your patient.        Sincerely,        Chanda Beck PA-C

## 2019-11-13 NOTE — PATIENT INSTRUCTIONS
Work on allergy avoidance.   Continue with Flonase 2 sprays to each nostril daily or as needed  Continue with Zyrtec daily or seasonally.       Thank you for allowing Chanda Beck PA-C and our ENT team to participate in your care.  If your medications are too expensive, please give the nurse a call.  We can possibly change this medication.  If you have a scheduling or an appointment question please contact our Health Unit Coordinator at their direct line [phone].   ALL nursing questions or concerns can be directed to your ENT nurse at: [phone] Karen

## 2019-11-13 NOTE — NURSING NOTE
"Chief Complaint   Patient presents with     Other     MQT allergy skin testing       Initial /69 (BP Location: Right arm, Patient Position: Sitting, Cuff Size: Adult Regular)   Pulse 58   Temp 97.1  F (36.2  C) (Oral)   Ht 1.638 m (5' 4.5\")   Wt 54 kg (119 lb)   SpO2 96%   BMI 20.11 kg/m   Estimated body mass index is 20.11 kg/m  as calculated from the following:    Height as of this encounter: 1.638 m (5' 4.5\").    Weight as of this encounter: 54 kg (119 lb).  Cynthia was seen for allergy skin testing. Patient was seen by this nurse in conjunction with ENT provider. All encounter details are documented in ENT Provider's appointment from this same date. Please see referenced encounter for this visits documentation.   Chelly Perez RN    "

## 2019-11-22 ENCOUNTER — OFFICE VISIT (OUTPATIENT)
Dept: FAMILY MEDICINE | Facility: OTHER | Age: 69
End: 2019-11-22
Attending: FAMILY MEDICINE
Payer: MEDICARE

## 2019-11-22 VITALS
DIASTOLIC BLOOD PRESSURE: 66 MMHG | RESPIRATION RATE: 16 BRPM | WEIGHT: 118.1 LBS | HEART RATE: 71 BPM | BODY MASS INDEX: 20.16 KG/M2 | OXYGEN SATURATION: 98 % | TEMPERATURE: 97.2 F | SYSTOLIC BLOOD PRESSURE: 118 MMHG | HEIGHT: 64 IN

## 2019-11-22 DIAGNOSIS — R79.9 ABNORMAL FINDING OF BLOOD CHEMISTRY, UNSPECIFIED: ICD-10-CM

## 2019-11-22 DIAGNOSIS — R79.89 OTHER SPECIFIED ABNORMAL FINDINGS OF BLOOD CHEMISTRY: ICD-10-CM

## 2019-11-22 DIAGNOSIS — R41.3 MEMORY CHANGES: Primary | ICD-10-CM

## 2019-11-22 LAB
ALBUMIN SERPL-MCNC: 3.9 G/DL (ref 3.4–5)
ALP SERPL-CCNC: 74 U/L (ref 40–150)
ALT SERPL W P-5'-P-CCNC: 22 U/L (ref 0–50)
ANION GAP SERPL CALCULATED.3IONS-SCNC: 5 MMOL/L (ref 3–14)
AST SERPL W P-5'-P-CCNC: 17 U/L (ref 0–45)
BILIRUB SERPL-MCNC: 0.4 MG/DL (ref 0.2–1.3)
BUN SERPL-MCNC: 26 MG/DL (ref 7–30)
CALCIUM SERPL-MCNC: 8.8 MG/DL (ref 8.5–10.1)
CHLORIDE SERPL-SCNC: 106 MMOL/L (ref 94–109)
CO2 SERPL-SCNC: 27 MMOL/L (ref 20–32)
CREAT SERPL-MCNC: 0.67 MG/DL (ref 0.52–1.04)
ERYTHROCYTE [DISTWIDTH] IN BLOOD BY AUTOMATED COUNT: 13.6 % (ref 10–15)
EST. AVERAGE GLUCOSE BLD GHB EST-MCNC: 114 MG/DL
FERRITIN SERPL-MCNC: 76 NG/ML (ref 8–252)
GFR SERPL CREATININE-BSD FRML MDRD: 90 ML/MIN/{1.73_M2}
GLUCOSE SERPL-MCNC: 95 MG/DL (ref 70–99)
HBA1C MFR BLD: 5.6 % (ref 0–5.6)
HCT VFR BLD AUTO: 39.3 % (ref 35–47)
HGB BLD-MCNC: 12.9 G/DL (ref 11.7–15.7)
MCH RBC QN AUTO: 30.8 PG (ref 26.5–33)
MCHC RBC AUTO-ENTMCNC: 32.8 G/DL (ref 31.5–36.5)
MCV RBC AUTO: 94 FL (ref 78–100)
PLATELET # BLD AUTO: 269 10E9/L (ref 150–450)
POTASSIUM SERPL-SCNC: 3.9 MMOL/L (ref 3.4–5.3)
PROT SERPL-MCNC: 7.3 G/DL (ref 6.8–8.8)
RBC # BLD AUTO: 4.19 10E12/L (ref 3.8–5.2)
SODIUM SERPL-SCNC: 138 MMOL/L (ref 133–144)
TSH SERPL DL<=0.005 MIU/L-ACNC: 1.56 MU/L (ref 0.4–4)
WBC # BLD AUTO: 5.3 10E9/L (ref 4–11)

## 2019-11-22 PROCEDURE — 99214 OFFICE O/P EST MOD 30 MIN: CPT | Performed by: FAMILY MEDICINE

## 2019-11-22 PROCEDURE — G0463 HOSPITAL OUTPT CLINIC VISIT: HCPCS

## 2019-11-22 PROCEDURE — 40000788 ZZHCL STATISTIC ESTIMATED AVERAGE GLUCOSE: Mod: ZL | Performed by: FAMILY MEDICINE

## 2019-11-22 PROCEDURE — 83036 HEMOGLOBIN GLYCOSYLATED A1C: CPT | Mod: ZL | Performed by: FAMILY MEDICINE

## 2019-11-22 PROCEDURE — 85027 COMPLETE CBC AUTOMATED: CPT | Mod: ZL | Performed by: FAMILY MEDICINE

## 2019-11-22 PROCEDURE — 36415 COLL VENOUS BLD VENIPUNCTURE: CPT | Mod: ZL | Performed by: FAMILY MEDICINE

## 2019-11-22 PROCEDURE — 84443 ASSAY THYROID STIM HORMONE: CPT | Mod: ZL | Performed by: FAMILY MEDICINE

## 2019-11-22 PROCEDURE — 86618 LYME DISEASE ANTIBODY: CPT | Mod: ZL | Performed by: FAMILY MEDICINE

## 2019-11-22 PROCEDURE — 80053 COMPREHEN METABOLIC PANEL: CPT | Mod: ZL | Performed by: FAMILY MEDICINE

## 2019-11-22 PROCEDURE — 82728 ASSAY OF FERRITIN: CPT | Mod: ZL | Performed by: FAMILY MEDICINE

## 2019-11-22 ASSESSMENT — MIFFLIN-ST. JEOR: SCORE: 1050.7

## 2019-11-22 ASSESSMENT — PAIN SCALES - GENERAL: PAINLEVEL: NO PAIN (0)

## 2019-11-22 NOTE — NURSING NOTE
"Chief Complaint   Patient presents with     Memory Loss       Initial /66 (BP Location: Right arm, Patient Position: Sitting, Cuff Size: Adult Regular)   Pulse 71   Temp 97.2  F (36.2  C) (Tympanic)   Resp 16   Ht 1.626 m (5' 4\")   Wt 53.6 kg (118 lb 1.6 oz)   SpO2 98%   BMI 20.27 kg/m   Estimated body mass index is 20.27 kg/m  as calculated from the following:    Height as of this encounter: 1.626 m (5' 4\").    Weight as of this encounter: 53.6 kg (118 lb 1.6 oz).  Medication Reconciliation: complete  Lyn Batres MA  "

## 2019-11-22 NOTE — PROGRESS NOTES
Subjective     Dena Cedeno is a 68 year old female who presents to clinic today for the following health issues:    HPI   Memory Issues      Duration: Since this summer    Description (location/character/radiation): feels like she is forgetting things, has some examples or forgetting her address and giving a different one, several examples    Intensity:  mild    Accompanying signs and symptoms: word retrieval is lacking    History (similar episodes/previous evaluation): None    Precipitating or alleviating factors: None    Therapies tried and outcome: None    Patient was having issues with headaches this summer, and thinks that memory issues have been worse since         Patient Active Problem List   Diagnosis     ACP (advance care planning)     Osteopenia     Anxiety     Insomnia     Allergic rhinitis     Closed nondisplaced fracture of fourth metatarsal bone of left foot with routine healing, subsequent encounter     Contact dermatitis and eczema     Family hx of colon cancer     Hx of adenomatous colonic polyps     Mucinous cystadenoma of appendix     RLQ abdominal pain     Traumatic closed fx proximal phalanx finger w/minimal displacement     HCD (health care directive)     Past Surgical History:   Procedure Laterality Date     APPENDECTOMY  2012     COLONOSCOPY  10/18/2013    8 year follow-up recommended     COLONOSCOPY  2018    [facility]     GYN SURGERY       D AND C     GYN SURGERY      TUBAL LIGATION       Social History     Tobacco Use     Smoking status: Former Smoker     Types: Cigarettes     Last attempt to quit: 1975     Years since quittin.9     Smokeless tobacco: Never Used     Tobacco comment: quit smoking in her 20s, about 1 pack-year history total   Substance Use Topics     Alcohol use: Yes     Comment: social:  1-2 drinks per week     Family History   Problem Relation Age of Onset     Osteoporosis Mother      Colon Cancer Mother 58     Cerebrovascular Disease Father       "Alzheimer Disease Father      Hypertension Father      Thyroid Cancer Daughter      Dementia Sister         FRONTAL LOBE     Colon Cancer Other         grandfather     Colon Cancer Other         aunt         Current Outpatient Medications   Medication Sig Dispense Refill     Calcium Carbonate (CALCIUM 600 PO) Take 2 tablets by mouth daily        cetirizine (ZYRTEC) 10 MG tablet Take 10 mg by mouth daily as needed        flunisolide (NASALIDE) 25 MCG/ACT (0.025%) SOLN spray Spray 2 sprays into both nostrils every 12 hours 1 Bottle 11     fluticasone (FLONASE) 50 MCG/ACT nasal spray USE ONE TO TWO SPRAY(S) IN EACH NOSTRIL ONCE DAILY 16 g 3     LORazepam (ATIVAN) 1 MG tablet TAKE 1 TABLET BY MOUTH ONCE DAILY AS NEEDED 30 tablet 5     magnesium 250 MG tablet Take 1 tablet by mouth 2 times daily        multivitamin  with lutein (OCUVITE WITH LTEIN) CAPS per capsule Take 1 capsule by mouth daily       Omega-3 Fatty Acids (FISH OIL) 1200 MG CAPS Take 1 capsule by mouth daily       order for DME Equipment being ordered: thigh high compression stocking, 15-20 mmHg 1 Device 0     Allergies   Allergen Reactions     Lactose      Intolerant     Seasonal Allergies      Penicillins Rash     Sulfa Drugs Rash         Reviewed and updated as needed this visit by Provider         Review of Systems   ROS COMP: Constitutional, HEENT, cardiovascular, pulmonary, gi and gu systems are negative, except as otherwise noted.      Objective    /66 (BP Location: Right arm, Patient Position: Sitting, Cuff Size: Adult Regular)   Pulse 71   Temp 97.2  F (36.2  C) (Tympanic)   Resp 16   Ht 1.626 m (5' 4\")   Wt 53.6 kg (118 lb 1.6 oz)   SpO2 98%   BMI 20.27 kg/m    Body mass index is 20.27 kg/m .  Physical Exam   GENERAL: healthy, alert and no distress  PSYCH: mentation appears normal, affect normal/bright    Diagnostic Test Results:  See orders        Assessment & Plan     1. Memory changes  Will start with labs and imaging as listed.  " Consider Neuropsych testing.  Follow-up as directed.  - CBC with platelets  - Comprehensive metabolic panel  - TSH with free T4 reflex  - Ferritin  - Lyme Disease Arielle with reflex to WB Serum  - MR Brain w/o & w Contrast; Future  - Hemoglobin A1c    2. Other specified abnormal findings of blood chemistry   - Ferritin    3. Abnormal finding of blood chemistry, unspecified   - Hemoglobin A1c       Over 25 minutes are spent with the patient, over 20 minutes of which was in education and counseling regarding current conditions and treatment/therapy options/risks/benefits/etc, including review of current symptoms, work-up recommended, and future planning.      Return if symptoms worsen or fail to improve.    Delaney Miller MD  [facility]

## 2019-11-25 LAB — B BURGDOR IGG+IGM SER QL: 0.06 (ref 0–0.89)

## 2019-12-03 ENCOUNTER — HOSPITAL ENCOUNTER (OUTPATIENT)
Dept: MRI IMAGING | Facility: HOSPITAL | Age: 69
Discharge: HOME | End: 2019-12-03
Attending: FAMILY MEDICINE | Admitting: FAMILY MEDICINE
Payer: MEDICARE

## 2019-12-03 DIAGNOSIS — R41.3 MEMORY CHANGES: ICD-10-CM

## 2019-12-03 PROCEDURE — 70553 MRI BRAIN STEM W/O & W/DYE: CPT | Mod: TC

## 2019-12-03 PROCEDURE — 25500064 ZZH RX 255 OP 636: Performed by: RADIOLOGY

## 2019-12-03 PROCEDURE — A9585 GADOBUTROL INJECTION: HCPCS | Performed by: RADIOLOGY

## 2019-12-03 RX ORDER — GADOBUTROL 604.72 MG/ML
2 INJECTION INTRAVENOUS ONCE
Status: COMPLETED | OUTPATIENT
Start: 2019-12-03 | End: 2019-12-03

## 2019-12-03 RX ADMIN — GADOBUTROL 2 ML: 604.72 INJECTION INTRAVENOUS at 13:49

## 2019-12-04 ENCOUNTER — TELEPHONE (OUTPATIENT)
Dept: FAMILY MEDICINE | Facility: OTHER | Age: 69
End: 2019-12-04

## 2019-12-04 DIAGNOSIS — R41.3 MEMORY CHANGES: Primary | ICD-10-CM

## 2019-12-04 DIAGNOSIS — R90.89 ABNORMAL FINDING ON MRI OF BRAIN: ICD-10-CM

## 2019-12-04 NOTE — TELEPHONE ENCOUNTER
Patient called clinic stating she spoke with primary nurse about MRI results and referral for Neurology. Patient states due to children living in [place] she prefers to see a neurologist in [place] unless she is able to see someone sooner through [facility].     [phone]

## 2020-02-26 DIAGNOSIS — F41.9 ANXIETY: ICD-10-CM

## 2020-02-26 RX ORDER — LORAZEPAM 1 MG/1
TABLET ORAL
Qty: 30 TABLET | Refills: 2 | Status: SHIPPED | OUTPATIENT
Start: 2020-02-26 | End: 2020-06-25

## 2020-02-26 NOTE — TELEPHONE ENCOUNTER
Lorazepam 1 MG      Last Written Prescription Date:  9-4-19  Last Fill Quantity: 30,   # refills: 5  Last Office Visit: 11-22-19  Future Office visit:       Routing refill request to provider for review/approval because:

## 2020-03-10 ENCOUNTER — HEALTH MAINTENANCE LETTER (OUTPATIENT)
Age: 70
End: 2020-03-10

## 2020-05-12 ENCOUNTER — VIRTUAL VISIT (OUTPATIENT)
Dept: FAMILY MEDICINE | Facility: OTHER | Age: 70
End: 2020-05-12
Attending: FAMILY MEDICINE
Payer: COMMERCIAL

## 2020-05-12 DIAGNOSIS — G60.9 IDIOPATHIC POLYNEUROPATHY: ICD-10-CM

## 2020-05-12 DIAGNOSIS — F41.9 ANXIETY: Primary | ICD-10-CM

## 2020-05-12 DIAGNOSIS — E67.3 HYPERVITAMINOSIS D: ICD-10-CM

## 2020-05-12 DIAGNOSIS — R90.89 ABNORMAL FINDING ON MRI OF BRAIN: ICD-10-CM

## 2020-05-12 DIAGNOSIS — R41.3 MEMORY LOSS: ICD-10-CM

## 2020-05-12 PROCEDURE — 99214 OFFICE O/P EST MOD 30 MIN: CPT | Mod: TEL | Performed by: FAMILY MEDICINE

## 2020-05-12 ASSESSMENT — ANXIETY QUESTIONNAIRES
GAD7 TOTAL SCORE: 4
2. NOT BEING ABLE TO STOP OR CONTROL WORRYING: SEVERAL DAYS
7. FEELING AFRAID AS IF SOMETHING AWFUL MIGHT HAPPEN: NOT AT ALL
1. FEELING NERVOUS, ANXIOUS, OR ON EDGE: SEVERAL DAYS
3. WORRYING TOO MUCH ABOUT DIFFERENT THINGS: SEVERAL DAYS
6. BECOMING EASILY ANNOYED OR IRRITABLE: SEVERAL DAYS
5. BEING SO RESTLESS THAT IT IS HARD TO SIT STILL: NOT AT ALL

## 2020-05-12 ASSESSMENT — PATIENT HEALTH QUESTIONNAIRE - PHQ9
5. POOR APPETITE OR OVEREATING: NOT AT ALL
SUM OF ALL RESPONSES TO PHQ QUESTIONS 1-9: 2

## 2020-05-12 ASSESSMENT — PAIN SCALES - GENERAL: PAINLEVEL: NO PAIN (0)

## 2020-05-12 NOTE — PROGRESS NOTES
"Dena Cedeno is a 69 year old female who is being evaluated via a billable telephone visit.      The patient has been notified of following:     \"This telephone visit will be conducted via a call between you and your physician/provider. We have found that certain health care needs can be provided without the need for a physical exam.  This service lets us provide the care you need with a short phone conversation.  If a prescription is necessary we can send it directly to your pharmacy.  If lab work is needed we can place an order for that and you can then stop by our lab to have the test done at a later time.    Telephone visits are billed at different rates depending on your insurance coverage. During this emergency period, for some insurers they may be billed the same as an in-person visit.  Please reach out to your insurance provider with any questions.    If during the course of the call the physician/provider feels a telephone visit is not appropriate, you will not be charged for this service.\"    Patient has given verbal consent for Telephone visit?  Yes    What phone number would you like to be contacted at? [phone]    How would you like to obtain your AVS? Byron Santos     Dena Cedeno is a 69 year old female who presents to clinic today for the following health issues:    HPI  Anxiety Follow-Up    How are you doing with your anxiety since your last visit? No change    Are you having other symptoms that might be associated with anxiety? Yes:  concerned with covid     Have you had a significant life event? No     Are you feeling depressed? No    Do you have any concerns with your use of alcohol or other drugs? No     Patient feels she is handling stress OK, and she does not feel medication adjustment is needed.    Social History     Tobacco Use     Smoking status: Former Smoker     Packs/day: 0.00     Years: 0.00     Pack years: 0.00     Types: Cigarettes     Last attempt to quit: " 1975     Years since quittin.3     Smokeless tobacco: Never Used     Tobacco comment: quit smoking in her 20s, about 1 pack-year history total   Substance Use Topics     Alcohol use: Yes     Comment: 1 drink a week     Drug use: No     NANCY-7 SCORE 10/23/2018 2019 2020   Total Score 0 2 4     PHQ 10/23/2018 2019 2020   PHQ-9 Total Score 0 2 2   Q9: Thoughts of better off dead/self-harm past 2 weeks Not at all Not at all Not at all     Last PHQ-9 2020   1.  Little interest or pleasure in doing things 1   2.  Feeling down, depressed, or hopeless 1   3.  Trouble falling or staying asleep, or sleeping too much 0   4.  Feeling tired or having little energy 0   5.  Poor appetite or overeating 0   6.  Feeling bad about yourself 0   7.  Trouble concentrating 0   8.  Moving slowly or restless 0   Q9: Thoughts of better off dead/self-harm past 2 weeks 0   PHQ-9 Total Score 2   Difficulty at work, home, or with people -     NANCY-7  2020   1. Feeling nervous, anxious, or on edge 1   2. Not being able to stop or control worrying 1   3. Worrying too much about different things 1   4. Trouble relaxing 0   5. Being so restless that it is hard to sit still 0   6. Becoming easily annoyed or irritable 1   7. Feeling afraid, as if something awful might happen 0   NANCY-7 Total Score 4   If you checked any problems, how difficult have they made it for you to do your work, take care of things at home, or get along with other people? -           Concern - Neurology Follow up   Onset: Since January    Description:   Would like to discuss, multiple test done    Intensity: mild    Progression of Symptoms:  same    Accompanying Signs & Symptoms:  Memory loss, short term    Previous history of similar problem:   Yes- Essfer[facility]. Does mitchell (anixety) appt     Precipitating factors:   Worsened by: none    Alleviating factors:  Improved by: none    Therapies Tried and outcome: Ativan            Patient  Active Problem List   Diagnosis     ACP (advance care planning)     Osteopenia     Anxiety     Insomnia     Allergic rhinitis     Closed nondisplaced fracture of fourth metatarsal bone of left foot with routine healing, subsequent encounter     Contact dermatitis and eczema     Family hx of colon cancer     Hx of adenomatous colonic polyps     Mucinous cystadenoma of appendix     RLQ abdominal pain     Traumatic closed fx proximal phalanx finger w/minimal displacement     HCD (health care directive)     Idiopathic polyneuropathy     Memory loss     Abnormal brain MRI     Past Surgical History:   Procedure Laterality Date     APPENDECTOMY  2012     COLONOSCOPY  10/18/2013    8 year follow-up recommended     [facility]OSCOPY  2018    [facility]     EYE SURGERY Bilateral 2018    Cataract      GYN SURGERY       D AND C     GYN SURGERY      TUBAL LIGATION       Social History     Tobacco Use     Smoking status: Former Smoker     Packs/day: 0.00     Years: 0.00     Pack years: 0.00     Types: Cigarettes     Last attempt to quit: 1975     Years since quittin.3     Smokeless tobacco: Never Used     Tobacco comment: quit smoking in her 20s, about 1 pack-year history total   Substance Use Topics     Alcohol use: Yes     Comment: 1 drink a week     Family History   Problem Relation Age of Onset     Osteoporosis Mother      Colon Cancer Mother 58     Diabetes Mother      Cerebrovascular Disease Father      Alzheimer Disease Father      Hypertension Father      Thyroid Cancer Daughter      Other Cancer Daughter      Obesity Daughter      Dementia Sister         FRONTAL LOBE     Colon Cancer Other         grandfather     Colon Cancer Other         aunt     Colon Cancer Paternal Grandmother      Thyroid Disease No family hx of          Current Outpatient Medications   Medication Sig Dispense Refill     Calcium Carbonate (CALCIUM 600 PO) Take 2 tablets by mouth daily        cetirizine (ZYRTEC) 10 MG tablet Take 10  mg by mouth daily as needed        fluticasone (FLONASE) 50 MCG/ACT nasal spray USE ONE TO TWO SPRAY(S) IN EACH NOSTRIL ONCE DAILY 16 g 3     LORazepam (ATIVAN) 1 MG tablet TAKE 1 TABLET BY MOUTH ONCE DAILY AS NEEDED 30 tablet 2     magnesium 250 MG tablet Take 1 tablet by mouth 2 times daily        multivitamin  with lutein (OCUVITE WITH LTEIN) CAPS per capsule Take 1 capsule by mouth daily       Omega-3 Fatty Acids (FISH OIL) 1200 MG CAPS Take 1 capsule by mouth daily       order for DME Equipment being ordered: thigh high compression stocking, 15-20 mmHg 1 Device 0     Allergies   Allergen Reactions     Lactose      Intolerant     Seasonal Allergies      Penicillins Rash     Sulfa Drugs Rash       Reviewed and updated as needed this visit by Provider  Tobacco  Allergies  Meds  Problems  Med Hx  Surg Hx  Fam Hx         Review of Systems   Constitutional, HEENT, cardiovascular, pulmonary, gi and gu systems are negative, except as otherwise noted.       Objective   Reported vitals:  There were no vitals taken for this visit.   PSYCH: Alert and oriented times 3; coherent speech, normal   rate and volume, able to articulate logical thoughts, able   to abstract reason, no tangential thoughts, no hallucinations   or delusions  Her affect is normal and pleasant  RESP: No cough, no audible wheezing, able to talk in full sentences  Remainder of exam unable to be completed due to telephone visits    Diagnostic Test Results:  none         Assessment/Plan:  1. Anxiety  No changes to current medications recommended.  Follow-up in six months, sooner as needed.    2. Abnormal finding on MRI of brain  Neurology notes and findings reviewed with patient.  All questions are answered to the best of my ability.  Patient will likely complete MRIs and tests scheduled for this summer, but she is unsure how much more testing she wants to completed.     3. Idiopathic polyneuropathy  As above.    4. Memory loss  As above.    5.  Hypervitaminosis D   Patient would like to recheck level once [facility] lab opens.  Order placed.  - Vitamin D Deficiency; Future    Return in about 6 months (around 11/12/2020) for Medication review.      Phone call duration:  29 minutes    Delaney Miller MD

## 2020-05-13 ASSESSMENT — ANXIETY QUESTIONNAIRES: GAD7 TOTAL SCORE: 4

## 2020-06-11 DIAGNOSIS — E67.3 HYPERVITAMINOSIS D: ICD-10-CM

## 2020-06-11 PROCEDURE — 82306 VITAMIN D 25 HYDROXY: CPT | Performed by: FAMILY MEDICINE

## 2020-06-11 PROCEDURE — 36415 COLL VENOUS BLD VENIPUNCTURE: CPT | Performed by: FAMILY MEDICINE

## 2020-06-12 LAB — DEPRECATED CALCIDIOL+CALCIFEROL SERPL-MC: 88 UG/L (ref 20–75)

## 2020-06-21 DIAGNOSIS — J01.90 ACUTE SINUSITIS WITH SYMPTOMS > 10 DAYS: ICD-10-CM

## 2020-06-22 RX ORDER — FLUTICASONE PROPIONATE 50 MCG
SPRAY, SUSPENSION (ML) NASAL
Qty: 16 G | Refills: 0 | Status: SHIPPED | OUTPATIENT
Start: 2020-06-22 | End: 2020-07-20

## 2020-06-22 NOTE — TELEPHONE ENCOUNTER
Flonase       Last Written Prescription Date:  6/25/2019  Last Fill Quantity: 16g,   # refills: 3  Last Office Visit: 5/12/2020  Future Office visit:

## 2020-06-25 DIAGNOSIS — F41.9 ANXIETY: ICD-10-CM

## 2020-06-25 RX ORDER — LORAZEPAM 1 MG/1
TABLET ORAL
Qty: 30 TABLET | Refills: 1 | Status: SHIPPED | OUTPATIENT
Start: 2020-06-25 | End: 2020-09-21

## 2020-06-25 NOTE — TELEPHONE ENCOUNTER
lorazepam      Last Written Prescription Date:  2/26/20  Last Fill Quantity: 30,   # refills: 2  Last Office Visit: 5/12/20  Future Office visit:       Routing refill request to provider for review/approval because:  Drug not on the G, P or [facility] refill protocol or controlled substance

## 2020-07-19 DIAGNOSIS — J01.90 ACUTE SINUSITIS WITH SYMPTOMS > 10 DAYS: ICD-10-CM

## 2020-07-20 RX ORDER — FLUTICASONE PROPIONATE 50 MCG
SPRAY, SUSPENSION (ML) NASAL
Qty: 16 G | Refills: 0 | Status: SHIPPED | OUTPATIENT
Start: 2020-07-20 | End: 2020-08-31

## 2020-08-29 DIAGNOSIS — J01.90 ACUTE SINUSITIS WITH SYMPTOMS > 10 DAYS: ICD-10-CM

## 2020-08-29 NOTE — TELEPHONE ENCOUNTER
flonase      Last Written Prescription Date:  7/20/20  Last Fill Quantity: 16g,   # refills: 0  Last Office Visit: 5/12/20  Future Office visit:    Next 5 appointments (look out 90 days)    Nov 12, 2020  9:45 AM CST  (Arrive by 9:30 AM)  SHORT with Delaney Miller MD  [facility] ([facility] ) [address]  [facility] [address]  [phone]

## 2020-08-31 RX ORDER — FLUTICASONE PROPIONATE 50 MCG
SPRAY, SUSPENSION (ML) NASAL
Qty: 16 G | Refills: 5 | Status: SHIPPED | OUTPATIENT
Start: 2020-08-31

## 2020-09-20 DIAGNOSIS — F41.9 ANXIETY: ICD-10-CM

## 2020-09-21 RX ORDER — LORAZEPAM 1 MG/1
TABLET ORAL
Qty: 30 TABLET | Refills: 0 | Status: SHIPPED | OUTPATIENT
Start: 2020-09-21 | End: 2020-11-11

## 2020-10-01 NOTE — PROGRESS NOTES
Subjective     Dena Cedeno is a 69 year old female who presents to clinic today for the following health issues:    HPI         Anxiety Follow-Up    How are you doing with your anxiety since your last visit? Was worse but seems to be getting better    Are you having other symptoms that might be associated with anxiety? No    Have you had a significant life event? No     Are you feeling depressed? No    Do you have any concerns with your use of alcohol or other drugs? No     Patient is working with a therapist and has started some supplements.  She states she was started on 5-HTP and dose was titrated up to 150 mg daily.  She feels she is overall doing well, and states she does not feel she needs any medication adjustment.  When she initially scheduled this appointment, she was going to ask for medication.  Now, she would just like to monitor symptoms.    Patient states she is still taking lorazepam at night to sleep.  She states her therapist recommended she wean off this medication.  She states she is having a hard time doing that, she is not sleeping well.  I did recommend she continue to wean off this medication due to the probable contribution this medication is making to her memory issues.    Social History     Tobacco Use     Smoking status: Former Smoker     Packs/day: 0.00     Years: 0.00     Pack years: 0.00     Types: Cigarettes     Quit date: 1975     Years since quittin.7     Smokeless tobacco: Never Used     Tobacco comment: quit smoking in her 20s, about 1 pack-year history total   Substance Use Topics     Alcohol use: Yes     Comment: 1 drink a week     Drug use: No     NANCY-7 SCORE 2019 2020 10/6/2020   Total Score 2 4 6     PHQ 2019 2020 10/6/2020   PHQ-9 Total Score 2 2 1   Q9: Thoughts of better off dead/self-harm past 2 weeks Not at all Not at all Not at all     Last PHQ-9 10/6/2020   1.  Little interest or pleasure in doing things 0   2.  Feeling down, depressed,  or hopeless 0   3.  Trouble falling or staying asleep, or sleeping too much 1   4.  Feeling tired or having little energy 0   5.  Poor appetite or overeating 0   6.  Feeling bad about yourself 0   7.  Trouble concentrating 0   8.  Moving slowly or restless 0   Q9: Thoughts of better off dead/self-harm past 2 weeks 0   PHQ-9 Total Score 1   Difficulty at work, home, or with people Not difficult at all     NANCY-7  10/6/2020   1. Feeling nervous, anxious, or on edge 1   2. Not being able to stop or control worrying 1   3. Worrying too much about different things 1   4. Trouble relaxing 1   5. Being so restless that it is hard to sit still 1   6. Becoming easily annoyed or irritable 1   7. Feeling afraid, as if something awful might happen 0   NANCY-7 Total Score 6   If you checked any problems, how difficult have they made it for you to do your work, take care of things at home, or get along with other people? Somewhat difficult         How many servings of fruits and vegetables do you eat daily?  4 or more    On average, how many sweetened beverages do you drink each day (Examples: soda, juice, sweet tea, etc.  Do NOT count diet or artificially sweetened beverages)?   0    How many days per week do you exercise enough to make your heart beat faster? 4    How many minutes a day do you exercise enough to make your heart beat faster? 30 - 60    How many days per week do you miss taking your medication? 0    Neurology notes and recommendations reviewed.  Patient will have repeat neuropsych testing next spring.      Patient Active Problem List   Diagnosis     ACP (advance care planning)     Osteopenia     Anxiety     Insomnia     Allergic rhinitis     Closed nondisplaced fracture of fourth metatarsal bone of left foot with routine healing, subsequent encounter     Contact dermatitis and eczema     Family hx of colon cancer     Hx of adenomatous colonic polyps     Mucinous cystadenoma of appendix     RLQ abdominal pain      Traumatic closed fx proximal phalanx finger w/minimal displacement     HCD (health care directive)     Perineural cyst     Memory loss     CSF leak     Past Surgical History:   Procedure Laterality Date     APPENDECTOMY  2012     COLONOSCOPY  10/18/2013    8 year follow-up recommended     [facility]OSCOPY  2018    [facility]     EYE SURGERY Bilateral 2018    Cataract      GYN SURGERY       D AND C     GYN SURGERY      TUBAL LIGATION       Social History     Tobacco Use     Smoking status: Former Smoker     Packs/day: 0.00     Years: 0.00     Pack years: 0.00     Types: Cigarettes     Quit date: 1975     Years since quittin.7     Smokeless tobacco: Never Used     Tobacco comment: quit smoking in her 20s, about 1 pack-year history total   Substance Use Topics     Alcohol use: Yes     Comment: 1 drink a week     Family History   Problem Relation Age of Onset     Osteoporosis Mother      Colon Cancer Mother 58     Diabetes Mother      Cerebrovascular Disease Father      Alzheimer Disease Father      Hypertension Father      Thyroid Cancer Daughter      Other Cancer Daughter      Obesity Daughter      Dementia Sister         FRONTAL LOBE     Colon Cancer Other         grandfather     Colon Cancer Other         aunt     Colon Cancer Paternal Grandmother      Thyroid Disease No family hx of            Current Outpatient Medications   Medication Sig Dispense Refill     5-Hydroxytryptophan (5-HTP PO)        fluticasone (FLONASE) 50 MCG/ACT nasal spray USE 1 TO 2 SPRAY(S) IN EACH NOSTRIL ONCE DAILY 16 g 5     L-THEANINE PO        LORazepam (ATIVAN) 1 MG tablet TAKE 1 TABLET BY MOUTH ONCE DAILY AS NEEDED 30 tablet 0     magnesium 250 MG tablet Take 1 tablet by mouth 2 times daily        Multiple Minerals-Vitamins (NUTRA-SUPPORT BONE PO)        multivitamin  with lutein (OCUVITE WITH LTEIN) CAPS per capsule Take 1 capsule by mouth daily       Omega-3 Fatty Acids (FISH OIL) 1200 MG CAPS Take 1 capsule by mouth  "daily       order for DME Equipment being ordered: thigh high compression stocking, 15-20 mmHg 1 Device 0     pyridOXINE (VITAMIN B-6) 100 mg/mL suspension Take 25 mg by mouth daily       Allergies   Allergen Reactions     Lactose      Intolerant     Seasonal Allergies      Penicillins Rash     Sulfa Drugs Rash       Family History, Social History, Tobacco Use are all reviewed and updated      Review of Systems   Constitutional, HEENT, cardiovascular, pulmonary, gi and gu systems are negative, except as otherwise noted.      Objective    /62 (BP Location: Left arm, Patient Position: Sitting, Cuff Size: Adult Regular)   Pulse 73   Temp 97.4  F (36.3  C) (Tympanic)   Resp 16   Ht 1.626 m (5' 4\")   Wt 52.1 kg (114 lb 12.8 oz)   SpO2 98%   BMI 19.71 kg/m    Body mass index is 19.71 kg/m .  Physical Exam   GENERAL: healthy, alert and no distress  PSYCH: mentation appears normal, affect normal/bright          Assessment & Plan     1. Anxiety  Patient will stay on her current supplements and she does agree to continue to wean down on Ativan.  We will cut down dose when refill due.  Follow-up as needed.          Over 45 minutes are spent with the patient, over 30 minutes of which was in education and counseling regarding current conditions and treatment/therapy options/risks/benefits/etc, including current medications, importance of weaning of Ativan, possible medication additions, and future planning.      No follow-ups on file.    Delaney Miller MD  [facility]    "

## 2020-10-06 ENCOUNTER — OFFICE VISIT (OUTPATIENT)
Dept: FAMILY MEDICINE | Facility: OTHER | Age: 70
End: 2020-10-06
Attending: FAMILY MEDICINE
Payer: COMMERCIAL

## 2020-10-06 VITALS
TEMPERATURE: 97.4 F | BODY MASS INDEX: 19.6 KG/M2 | HEART RATE: 73 BPM | OXYGEN SATURATION: 98 % | WEIGHT: 114.8 LBS | HEIGHT: 64 IN | SYSTOLIC BLOOD PRESSURE: 116 MMHG | DIASTOLIC BLOOD PRESSURE: 62 MMHG | RESPIRATION RATE: 16 BRPM

## 2020-10-06 DIAGNOSIS — F41.9 ANXIETY: Primary | ICD-10-CM

## 2020-10-06 PROBLEM — G96.191 PERINEURAL CYST: Status: ACTIVE | Noted: 2020-01-08

## 2020-10-06 PROBLEM — R90.89 ABNORMAL BRAIN MRI: Status: RESOLVED | Noted: 2020-01-08 | Resolved: 2020-10-06

## 2020-10-06 PROBLEM — G96.00 CSF LEAK: Status: ACTIVE | Noted: 2020-07-29

## 2020-10-06 PROCEDURE — 99215 OFFICE O/P EST HI 40 MIN: CPT | Performed by: FAMILY MEDICINE

## 2020-10-06 PROCEDURE — G0463 HOSPITAL OUTPT CLINIC VISIT: HCPCS

## 2020-10-06 RX ORDER — PYRIDOXINE HCL (VITAMIN B6) 25 MG
25 TABLET ORAL DAILY
COMMUNITY

## 2020-10-06 ASSESSMENT — MIFFLIN-ST. JEOR: SCORE: 1030.73

## 2020-10-06 ASSESSMENT — ANXIETY QUESTIONNAIRES
6. BECOMING EASILY ANNOYED OR IRRITABLE: SEVERAL DAYS
IF YOU CHECKED OFF ANY PROBLEMS ON THIS QUESTIONNAIRE, HOW DIFFICULT HAVE THESE PROBLEMS MADE IT FOR YOU TO DO YOUR WORK, TAKE CARE OF THINGS AT HOME, OR GET ALONG WITH OTHER PEOPLE: SOMEWHAT DIFFICULT
4. TROUBLE RELAXING: SEVERAL DAYS
GAD7 TOTAL SCORE: 6
2. NOT BEING ABLE TO STOP OR CONTROL WORRYING: SEVERAL DAYS
7. FEELING AFRAID AS IF SOMETHING AWFUL MIGHT HAPPEN: NOT AT ALL
3. WORRYING TOO MUCH ABOUT DIFFERENT THINGS: SEVERAL DAYS
5. BEING SO RESTLESS THAT IT IS HARD TO SIT STILL: SEVERAL DAYS
1. FEELING NERVOUS, ANXIOUS, OR ON EDGE: SEVERAL DAYS

## 2020-10-06 ASSESSMENT — PATIENT HEALTH QUESTIONNAIRE - PHQ9: SUM OF ALL RESPONSES TO PHQ QUESTIONS 1-9: 1

## 2020-10-06 ASSESSMENT — PAIN SCALES - GENERAL: PAINLEVEL: NO PAIN (0)

## 2020-10-06 NOTE — NURSING NOTE
"Chief Complaint   Patient presents with     Imm/Inj     Flu Shot     Anxiety       Initial /62 (BP Location: Left arm, Patient Position: Sitting, Cuff Size: Adult Regular)   Pulse 73   Temp 97.4  F (36.3  C) (Tympanic)   Resp 16   Ht 1.626 m (5' 4\")   Wt 52.1 kg (114 lb 12.8 oz)   SpO2 98%   BMI 19.71 kg/m   Estimated body mass index is 19.71 kg/m  as calculated from the following:    Height as of this encounter: 1.626 m (5' 4\").    Weight as of this encounter: 52.1 kg (114 lb 12.8 oz).  Medication Reconciliation: complete  Lyn Batres MA  "

## 2020-10-07 ASSESSMENT — ANXIETY QUESTIONNAIRES: GAD7 TOTAL SCORE: 6

## 2020-11-19 ENCOUNTER — TRANSFERRED RECORDS (OUTPATIENT)
Dept: HEALTH INFORMATION MANAGEMENT | Facility: CLINIC | Age: 70
End: 2020-11-19

## 2021-04-24 ENCOUNTER — HEALTH MAINTENANCE LETTER (OUTPATIENT)
Age: 71
End: 2021-04-24

## 2021-05-30 ENCOUNTER — TRANSFERRED RECORDS (OUTPATIENT)
Dept: HEALTH INFORMATION MANAGEMENT | Facility: CLINIC | Age: 71
End: 2021-05-30

## 2021-12-08 ENCOUNTER — TRANSFERRED RECORDS (OUTPATIENT)
Dept: HEALTH INFORMATION MANAGEMENT | Facility: CLINIC | Age: 71
End: 2021-12-08

## 2021-12-22 ENCOUNTER — TELEPHONE (OUTPATIENT)
Dept: FAMILY MEDICINE | Facility: OTHER | Age: 71
End: 2021-12-22
Payer: COMMERCIAL

## 2022-05-16 ENCOUNTER — HEALTH MAINTENANCE LETTER (OUTPATIENT)
Age: 72
End: 2022-05-16

## 2022-09-11 ENCOUNTER — HEALTH MAINTENANCE LETTER (OUTPATIENT)
Age: 72
End: 2022-09-11

## 2023-06-03 ENCOUNTER — HEALTH MAINTENANCE LETTER (OUTPATIENT)
Age: 73
End: 2023-06-03

## 2024-02-24 ENCOUNTER — HEALTH MAINTENANCE LETTER (OUTPATIENT)
Age: 74
End: 2024-02-24

## 2024-12-03 NOTE — NURSING NOTE
Chief Complaint   Patient presents with     Derm Problem     right lower leg moles     Other     Patient would like to compare bone density tests, brought in records.       Initial /62 (BP Location: Right arm, Patient Position: Sitting, Cuff Size: Adult Regular)  Pulse 80  Temp 96.5  F (35.8  C) (Tympanic)  Resp 14  Ht 5' (1.524 m)  Wt 116 lb 12.8 oz (53 kg)  SpO2 99%  BMI 22.81 kg/m2 Estimated body mass index is 22.81 kg/(m^2) as calculated from the following:    Height as of this encounter: 5' (1.524 m).    Weight as of this encounter: 116 lb 12.8 oz (53 kg).  Medication Reconciliation: complete   Kavitha Ricks       Name from pharmacy: SPIRONOLACTONE 25MG TABLETS          Will file in chart as: spironolactone (ALDACTONE) 25 MG tablet    Sig: TAKE 1 TABLET BY MOUTH EVERY DAY    Disp: 90 tablet    Refills: Not specified    Start: 12/3/2024    Class: E-Prescribe    Non-formulary For: Hirsutism, Hypokalemia, Benign essential hypertension    To pharmacy: **Patient requests 90 days supply**    Last ordered: 2 weeks ago (11/18/2024) by Nghia Hinton MD    Last refill: 12/3/2024    Rx #: [id number]    Diuretics (Including Combos) Protocol [id number]/03/2024 12:20 PM   Protocol Details Most recent blood pressure under 140/90 in past 12 months    Medication is active on med list    Has GFR on file in past 12 months and most recent value is normal    Medication indicated for associated diagnosis    Recent (12 mo) or future (90 days) visit within the authorizing provider's specialty    Patient is age 18 or older    No active pregancy on record    No positive pregnancy test in past 12 months        BP Readings from Last 3 Encounters:   12/02/24 121/85   11/18/24 (!) 138/99   06/26/24 117/83     GFR Estimate   Date Value Ref Range Status   11/18/2024 >90 >60 mL/min/1.73m2 Final     Comment:     eGFR calculated using 2021 CKD-EPI equation.   05/15/2021 >60 >60 mL/min/1.73m2 Final   08/16/2019 >90 >60 mL/min/[1.73_m2] Final     Comment:     Non  GFR Calc  Starting 12/18/2018, serum creatinine based estimated GFR (eGFR) will be   calculated using the Chronic Kidney Disease Epidemiology Collaboration   (CKD-EPI) equation.       Prescription approved per [facility] Refill Protocol.  HOMERO Diaz, BSN